# Patient Record
Sex: FEMALE | Race: WHITE | NOT HISPANIC OR LATINO
[De-identification: names, ages, dates, MRNs, and addresses within clinical notes are randomized per-mention and may not be internally consistent; named-entity substitution may affect disease eponyms.]

---

## 2021-10-24 ENCOUNTER — NON-APPOINTMENT (OUTPATIENT)
Age: 36
End: 2021-10-24

## 2021-10-25 ENCOUNTER — TRANSCRIPTION ENCOUNTER (OUTPATIENT)
Age: 36
End: 2021-10-25

## 2021-10-25 ENCOUNTER — APPOINTMENT (OUTPATIENT)
Dept: INTERNAL MEDICINE | Facility: CLINIC | Age: 36
End: 2021-10-25
Payer: COMMERCIAL

## 2021-10-25 VITALS
OXYGEN SATURATION: 100 % | HEIGHT: 62 IN | BODY MASS INDEX: 24.11 KG/M2 | WEIGHT: 131 LBS | HEART RATE: 71 BPM | DIASTOLIC BLOOD PRESSURE: 77 MMHG | SYSTOLIC BLOOD PRESSURE: 121 MMHG | TEMPERATURE: 97.2 F

## 2021-10-25 DIAGNOSIS — Z78.9 OTHER SPECIFIED HEALTH STATUS: ICD-10-CM

## 2021-10-25 DIAGNOSIS — Z82.49 FAMILY HISTORY OF ISCHEMIC HEART DISEASE AND OTHER DISEASES OF THE CIRCULATORY SYSTEM: ICD-10-CM

## 2021-10-25 DIAGNOSIS — Z00.00 ENCOUNTER FOR GENERAL ADULT MEDICAL EXAMINATION W/OUT ABNORMAL FINDINGS: ICD-10-CM

## 2021-10-25 DIAGNOSIS — Z83.511 FAMILY HISTORY OF GLAUCOMA: ICD-10-CM

## 2021-10-25 DIAGNOSIS — L30.9 DERMATITIS, UNSPECIFIED: ICD-10-CM

## 2021-10-25 PROCEDURE — 99385 PREV VISIT NEW AGE 18-39: CPT | Mod: 25

## 2021-10-25 PROCEDURE — 90686 IIV4 VACC NO PRSV 0.5 ML IM: CPT

## 2021-10-25 PROCEDURE — G0008: CPT

## 2021-10-25 PROCEDURE — 36415 COLL VENOUS BLD VENIPUNCTURE: CPT

## 2021-10-25 RX ORDER — CETIRIZINE HYDROCHLORIDE 10 MG/1
CAPSULE, LIQUID FILLED ORAL
Refills: 0 | Status: ACTIVE | COMMUNITY

## 2021-10-25 NOTE — PHYSICAL EXAM
[No Acute Distress] : no acute distress [Normal Sclera/Conjunctiva] : normal sclera/conjunctiva [No Lymphadenopathy] : no lymphadenopathy [Clear to Auscultation] : lungs were clear to auscultation bilaterally [Normal Rate] : normal rate  [Regular Rhythm] : with a regular rhythm [Pedal Pulses Present] : the pedal pulses are present [No Edema] : there was no peripheral edema [Declined Breast Exam] : declined breast exam  [Soft] : abdomen soft [Non Tender] : non-tender [Normal Supraclavicular Nodes] : no supraclavicular lymphadenopathy [Normal Axillary Nodes] : no axillary lymphadenopathy [Normal Posterior Cervical Nodes] : no posterior cervical lymphadenopathy [Normal Anterior Cervical Nodes] : no anterior cervical lymphadenopathy [No CVA Tenderness] : no CVA  tenderness [No Rash] : no rash [Normal] : affect was normal and insight and judgment were intact

## 2021-10-25 NOTE — HISTORY OF PRESENT ILLNESS
[FreeTextEntry1] : 1. Pt presents to establish Primary Care and is requesting an Annual Physical.\par 2. COVID vaccine x 2

## 2021-10-25 NOTE — HEALTH RISK ASSESSMENT
[Yes] : Yes [Monthly or less (1 pt)] : Monthly or less (1 point) [1 or 2 (0 pts)] : 1 or 2 (0 points) [No] : In the past 12 months have you used drugs other than those required for medical reasons? No [0] : 2) Feeling down, depressed, or hopeless: Not at all (0) [PHQ-2 Negative - No further assessment needed] : PHQ-2 Negative - No further assessment needed [Patient reported PAP Smear was normal] : Patient reported PAP Smear was normal [HIV Test offered] : HIV Test offered [Hepatitis C test offered] : Hepatitis C test offered [With Family] : lives with family [Employed] : employed [] :  [Sexually Active] : sexually active [Smoke Detector] : smoke detector [Carbon Monoxide Detector] : carbon monoxide detector [Seat Belt] :  uses seat belt [Sunscreen] : uses sunscreen [] : No [[id number]] : 0 [Reports changes in hearing] : Reports no changes in hearing [Reports changes in vision] : Reports no changes in vision [Reports changes in dental health] : Reports no changes in dental health [Guns at Home] : no guns at home [PapSmearDate] : 5/2021 [de-identified] :  [place]  [de-identified] : BCP

## 2021-10-26 DIAGNOSIS — Z78.9 OTHER SPECIFIED HEALTH STATUS: ICD-10-CM

## 2021-10-26 LAB
ALBUMIN SERPL ELPH-MCNC: 4.7 G/DL
ALP BLD-CCNC: 57 U/L
ALT SERPL-CCNC: 13 U/L
ANION GAP SERPL CALC-SCNC: 13 MMOL/L
APPEARANCE: CLEAR
AST SERPL-CCNC: 15 U/L
BACTERIA: ABNORMAL
BASOPHILS # BLD AUTO: 0.05 K/UL
BASOPHILS NFR BLD AUTO: 0.6 %
BILIRUB SERPL-MCNC: 0.4 MG/DL
BILIRUBIN URINE: NEGATIVE
BLOOD URINE: NEGATIVE
BUN SERPL-MCNC: 10 MG/DL
C TRACH RRNA SPEC QL NAA+PROBE: NOT DETECTED
CALCIUM SERPL-MCNC: 9.4 MG/DL
CHLORIDE SERPL-SCNC: 102 MMOL/L
CHOLEST SERPL-MCNC: 239 MG/DL
CO2 SERPL-SCNC: 22 MMOL/L
COLOR: COLORLESS
CREAT SERPL-MCNC: 0.58 MG/DL
EOSINOPHIL # BLD AUTO: 0.21 K/UL
EOSINOPHIL NFR BLD AUTO: 2.5 %
ESTIMATED AVERAGE GLUCOSE: 97 MG/DL
FOLATE SERPL-MCNC: 16.7 NG/ML
GLUCOSE QUALITATIVE U: NEGATIVE
GLUCOSE SERPL-MCNC: 78 MG/DL
HBA1C MFR BLD HPLC: 5 %
HBV SURFACE AB SER QL: NONREACTIVE
HBV SURFACE AG SER QL: NONREACTIVE
HCT VFR BLD CALC: 43.1 %
HCV AB SER QL: NONREACTIVE
HCV S/CO RATIO: 0.11 S/CO
HDLC SERPL-MCNC: 65 MG/DL
HGB BLD-MCNC: 13.5 G/DL
HYALINE CASTS: 3 /LPF
IMM GRANULOCYTES NFR BLD AUTO: 0.5 %
KETONES URINE: NEGATIVE
LDLC SERPL CALC-MCNC: 146 MG/DL
LEUKOCYTE ESTERASE URINE: ABNORMAL
LYMPHOCYTES # BLD AUTO: 2.13 K/UL
LYMPHOCYTES NFR BLD AUTO: 25.8 %
MAN DIFF?: NORMAL
MCHC RBC-ENTMCNC: 29.7 PG
MCHC RBC-ENTMCNC: 31.3 GM/DL
MCV RBC AUTO: 94.9 FL
MICROSCOPIC-UA: NORMAL
MONOCYTES # BLD AUTO: 0.51 K/UL
MONOCYTES NFR BLD AUTO: 6.2 %
N GONORRHOEA RRNA SPEC QL NAA+PROBE: NOT DETECTED
NEUTROPHILS # BLD AUTO: 5.31 K/UL
NEUTROPHILS NFR BLD AUTO: 64.4 %
NITRITE URINE: NEGATIVE
NONHDLC SERPL-MCNC: 174 MG/DL
PH URINE: 7
PLATELET # BLD AUTO: 290 K/UL
POTASSIUM SERPL-SCNC: 4.5 MMOL/L
PROT SERPL-MCNC: 7.1 G/DL
PROTEIN URINE: NEGATIVE
RBC # BLD: 4.54 M/UL
RBC # FLD: 13.1 %
RED BLOOD CELLS URINE: 2 /HPF
SODIUM SERPL-SCNC: 137 MMOL/L
SOURCE AMPLIFICATION: NORMAL
SPECIFIC GRAVITY URINE: 1.01
SQUAMOUS EPITHELIAL CELLS: 4 /HPF
T PALLIDUM AB SER QL IA: NEGATIVE
TRIGL SERPL-MCNC: 140 MG/DL
TSH SERPL-ACNC: 3.61 UIU/ML
UROBILINOGEN URINE: NORMAL
VIT B12 SERPL-MCNC: 292 PG/ML
WBC # FLD AUTO: 8.25 K/UL
WHITE BLOOD CELLS URINE: 12 /HPF

## 2021-10-27 ENCOUNTER — TRANSCRIPTION ENCOUNTER (OUTPATIENT)
Age: 36
End: 2021-10-27

## 2021-10-27 LAB — HIV1+2 AB SPEC QL IA.RAPID: NONREACTIVE

## 2022-04-25 ENCOUNTER — TRANSCRIPTION ENCOUNTER (OUTPATIENT)
Age: 37
End: 2022-04-25

## 2022-04-27 ENCOUNTER — APPOINTMENT (OUTPATIENT)
Dept: INTERNAL MEDICINE | Facility: CLINIC | Age: 37
End: 2022-04-27
Payer: COMMERCIAL

## 2022-04-27 VITALS
HEART RATE: 89 BPM | WEIGHT: 128 LBS | OXYGEN SATURATION: 98 % | DIASTOLIC BLOOD PRESSURE: 75 MMHG | HEIGHT: 62 IN | BODY MASS INDEX: 23.55 KG/M2 | TEMPERATURE: 97.9 F | SYSTOLIC BLOOD PRESSURE: 125 MMHG

## 2022-04-27 DIAGNOSIS — E78.00 PURE HYPERCHOLESTEROLEMIA, UNSPECIFIED: ICD-10-CM

## 2022-04-27 DIAGNOSIS — Z23 ENCOUNTER FOR IMMUNIZATION: ICD-10-CM

## 2022-04-27 PROCEDURE — 99212 OFFICE O/P EST SF 10 MIN: CPT | Mod: 25

## 2022-04-27 PROCEDURE — 90746 HEPB VACCINE 3 DOSE ADULT IM: CPT

## 2022-04-27 PROCEDURE — 90715 TDAP VACCINE 7 YRS/> IM: CPT

## 2022-04-27 PROCEDURE — 90471 IMMUNIZATION ADMIN: CPT

## 2022-04-27 PROCEDURE — 90472 IMMUNIZATION ADMIN EACH ADD: CPT

## 2022-04-27 RX ORDER — NORETHINDRONE AND ETHINYL ESTRADIOL 1 MG-35MCG
1-35 KIT ORAL
Qty: 84 | Refills: 0 | Status: DISCONTINUED | COMMUNITY
Start: 2021-05-10 | End: 2022-04-27

## 2022-04-28 ENCOUNTER — TRANSCRIPTION ENCOUNTER (OUTPATIENT)
Age: 37
End: 2022-04-28

## 2022-04-28 LAB
CHOLEST SERPL-MCNC: 197 MG/DL
HDLC SERPL-MCNC: 60 MG/DL
LDLC SERPL CALC-MCNC: 119 MG/DL
NONHDLC SERPL-MCNC: 138 MG/DL
TRIGL SERPL-MCNC: 93 MG/DL

## 2022-07-11 ENCOUNTER — APPOINTMENT (OUTPATIENT)
Dept: OBGYN | Facility: CLINIC | Age: 37
End: 2022-07-11

## 2022-08-25 ENCOUNTER — APPOINTMENT (OUTPATIENT)
Dept: ANTEPARTUM | Facility: CLINIC | Age: 37
End: 2022-08-25

## 2022-08-25 ENCOUNTER — ASOB RESULT (OUTPATIENT)
Age: 37
End: 2022-08-25

## 2022-08-25 ENCOUNTER — LABORATORY RESULT (OUTPATIENT)
Age: 37
End: 2022-08-25

## 2022-08-25 PROCEDURE — 76813 OB US NUCHAL MEAS 1 GEST: CPT

## 2022-08-25 PROCEDURE — 36415 COLL VENOUS BLD VENIPUNCTURE: CPT

## 2022-08-25 PROCEDURE — 76801 OB US < 14 WKS SINGLE FETUS: CPT

## 2022-10-21 ENCOUNTER — ASOB RESULT (OUTPATIENT)
Age: 37
End: 2022-10-21

## 2022-10-21 ENCOUNTER — APPOINTMENT (OUTPATIENT)
Dept: ANTEPARTUM | Facility: CLINIC | Age: 37
End: 2022-10-21

## 2022-10-21 PROCEDURE — 76811 OB US DETAILED SNGL FETUS: CPT

## 2023-03-07 ENCOUNTER — TRANSCRIPTION ENCOUNTER (OUTPATIENT)
Age: 38
End: 2023-03-07

## 2023-03-07 ENCOUNTER — INPATIENT (INPATIENT)
Facility: HOSPITAL | Age: 38
LOS: 3 days | Discharge: HOME | End: 2023-03-11
Attending: STUDENT IN AN ORGANIZED HEALTH CARE EDUCATION/TRAINING PROGRAM | Admitting: STUDENT IN AN ORGANIZED HEALTH CARE EDUCATION/TRAINING PROGRAM
Payer: COMMERCIAL

## 2023-03-07 VITALS — DIASTOLIC BLOOD PRESSURE: 84 MMHG | TEMPERATURE: 98 F | HEART RATE: 93 BPM | SYSTOLIC BLOOD PRESSURE: 129 MMHG

## 2023-03-07 DIAGNOSIS — O26.899 OTHER SPECIFIED PREGNANCY RELATED CONDITIONS, UNSPECIFIED TRIMESTER: ICD-10-CM

## 2023-03-08 ENCOUNTER — TRANSCRIPTION ENCOUNTER (OUTPATIENT)
Age: 38
End: 2023-03-08

## 2023-03-08 LAB
ALBUMIN SERPL ELPH-MCNC: 3.8 G/DL — SIGNIFICANT CHANGE UP (ref 3.3–5)
ALP SERPL-CCNC: 164 U/L — HIGH (ref 40–120)
ALT FLD-CCNC: 15 U/L — SIGNIFICANT CHANGE UP (ref 4–33)
ANION GAP SERPL CALC-SCNC: 17 MMOL/L — HIGH (ref 7–14)
APPEARANCE UR: ABNORMAL
APTT BLD: 26.2 SEC — LOW (ref 27–36.3)
AST SERPL-CCNC: 18 U/L — SIGNIFICANT CHANGE UP (ref 4–32)
BASOPHILS # BLD AUTO: 0 K/UL — SIGNIFICANT CHANGE UP (ref 0–0.2)
BASOPHILS NFR BLD AUTO: 0 % — SIGNIFICANT CHANGE UP (ref 0–2)
BILIRUB SERPL-MCNC: 0.5 MG/DL — SIGNIFICANT CHANGE UP (ref 0.2–1.2)
BILIRUB UR-MCNC: NEGATIVE — SIGNIFICANT CHANGE UP
BLD GP AB SCN SERPL QL: NEGATIVE — SIGNIFICANT CHANGE UP
BUN SERPL-MCNC: 7 MG/DL — SIGNIFICANT CHANGE UP (ref 7–23)
CALCIUM SERPL-MCNC: 9.5 MG/DL — SIGNIFICANT CHANGE UP (ref 8.4–10.5)
CHLORIDE SERPL-SCNC: 98 MMOL/L — SIGNIFICANT CHANGE UP (ref 98–107)
CO2 SERPL-SCNC: 20 MMOL/L — LOW (ref 22–31)
COLOR SPEC: ABNORMAL
COVID-19 SPIKE DOMAIN AB INTERP: POSITIVE
COVID-19 SPIKE DOMAIN ANTIBODY RESULT: >250 U/ML — HIGH
CREAT ?TM UR-MCNC: 32 MG/DL — SIGNIFICANT CHANGE UP
CREAT SERPL-MCNC: 0.49 MG/DL — LOW (ref 0.5–1.3)
DIFF PNL FLD: ABNORMAL
EGFR: 126 ML/MIN/1.73M2 — SIGNIFICANT CHANGE UP
EOSINOPHIL # BLD AUTO: 0.11 K/UL — SIGNIFICANT CHANGE UP (ref 0–0.5)
EOSINOPHIL NFR BLD AUTO: 0.8 % — SIGNIFICANT CHANGE UP (ref 0–6)
GLUCOSE BLDC GLUCOMTR-MCNC: 74 MG/DL — SIGNIFICANT CHANGE UP (ref 70–99)
GLUCOSE SERPL-MCNC: 50 MG/DL — CRITICAL LOW (ref 70–99)
GLUCOSE UR QL: NEGATIVE — SIGNIFICANT CHANGE UP
HCT VFR BLD CALC: 37.6 % — SIGNIFICANT CHANGE UP (ref 34.5–45)
HGB BLD-MCNC: 12.5 G/DL — SIGNIFICANT CHANGE UP (ref 11.5–15.5)
IANC: 9.4 K/UL — HIGH (ref 1.8–7.4)
INR BLD: 0.95 RATIO — SIGNIFICANT CHANGE UP (ref 0.88–1.16)
KETONES UR-MCNC: ABNORMAL
LDH SERPL L TO P-CCNC: 216 U/L — SIGNIFICANT CHANGE UP (ref 135–225)
LEUKOCYTE ESTERASE UR-ACNC: ABNORMAL
LYMPHOCYTES # BLD AUTO: 14.8 % — SIGNIFICANT CHANGE UP (ref 13–44)
LYMPHOCYTES # BLD AUTO: 2.05 K/UL — SIGNIFICANT CHANGE UP (ref 1–3.3)
MCHC RBC-ENTMCNC: 30.6 PG — SIGNIFICANT CHANGE UP (ref 27–34)
MCHC RBC-ENTMCNC: 33.2 GM/DL — SIGNIFICANT CHANGE UP (ref 32–36)
MCV RBC AUTO: 91.9 FL — SIGNIFICANT CHANGE UP (ref 80–100)
MONOCYTES # BLD AUTO: 0.84 K/UL — SIGNIFICANT CHANGE UP (ref 0–0.9)
MONOCYTES NFR BLD AUTO: 6.1 % — SIGNIFICANT CHANGE UP (ref 2–14)
NEUTROPHILS # BLD AUTO: 10.24 K/UL — HIGH (ref 1.8–7.4)
NEUTROPHILS NFR BLD AUTO: 73.9 % — SIGNIFICANT CHANGE UP (ref 43–77)
NITRITE UR-MCNC: NEGATIVE — SIGNIFICANT CHANGE UP
PH UR: 6.5 — SIGNIFICANT CHANGE UP (ref 5–8)
PLATELET # BLD AUTO: 214 K/UL — SIGNIFICANT CHANGE UP (ref 150–400)
POTASSIUM SERPL-MCNC: 3.4 MMOL/L — LOW (ref 3.5–5.3)
POTASSIUM SERPL-SCNC: 3.4 MMOL/L — LOW (ref 3.5–5.3)
PROT ?TM UR-MCNC: 7 MG/DL — SIGNIFICANT CHANGE UP
PROT ?TM UR-MCNC: 7 MG/DL — SIGNIFICANT CHANGE UP
PROT SERPL-MCNC: 6.5 G/DL — SIGNIFICANT CHANGE UP (ref 6–8.3)
PROT UR-MCNC: ABNORMAL
PROT/CREAT UR-RTO: 0.2 RATIO — SIGNIFICANT CHANGE UP (ref 0–0.2)
PROTHROM AB SERPL-ACNC: 11 SEC — SIGNIFICANT CHANGE UP (ref 10.5–13.4)
RBC # BLD: 4.09 M/UL — SIGNIFICANT CHANGE UP (ref 3.8–5.2)
RBC # FLD: 14.1 % — SIGNIFICANT CHANGE UP (ref 10.3–14.5)
RH IG SCN BLD-IMP: POSITIVE — SIGNIFICANT CHANGE UP
RH IG SCN BLD-IMP: POSITIVE — SIGNIFICANT CHANGE UP
SARS-COV-2 IGG+IGM SERPL QL IA: >250 U/ML — HIGH
SARS-COV-2 IGG+IGM SERPL QL IA: POSITIVE
SARS-COV-2 RNA SPEC QL NAA+PROBE: SIGNIFICANT CHANGE UP
SODIUM SERPL-SCNC: 135 MMOL/L — SIGNIFICANT CHANGE UP (ref 135–145)
SP GR SPEC: 1.01 — LOW (ref 1.01–1.05)
T PALLIDUM AB TITR SER: NEGATIVE — SIGNIFICANT CHANGE UP
URATE SERPL-MCNC: 4.3 MG/DL — SIGNIFICANT CHANGE UP (ref 2.5–7)
UROBILINOGEN FLD QL: SIGNIFICANT CHANGE UP
WBC # BLD: 13.85 K/UL — HIGH (ref 3.8–10.5)
WBC # FLD AUTO: 13.85 K/UL — HIGH (ref 3.8–10.5)

## 2023-03-08 PROCEDURE — 88307 TISSUE EXAM BY PATHOLOGIST: CPT | Mod: 26

## 2023-03-08 DEVICE — SURGICEL POWDER 3 GRAMS: Type: IMPLANTABLE DEVICE | Status: FUNCTIONAL

## 2023-03-08 RX ORDER — SODIUM CHLORIDE 9 MG/ML
1000 INJECTION, SOLUTION INTRAVENOUS
Refills: 0 | Status: DISCONTINUED | OUTPATIENT
Start: 2023-03-08 | End: 2023-03-08

## 2023-03-08 RX ORDER — SODIUM CHLORIDE 9 MG/ML
500 INJECTION, SOLUTION INTRAVENOUS ONCE
Refills: 0 | Status: DISCONTINUED | OUTPATIENT
Start: 2023-03-08 | End: 2023-03-11

## 2023-03-08 RX ORDER — SODIUM CHLORIDE 9 MG/ML
1000 INJECTION, SOLUTION INTRAVENOUS
Refills: 0 | Status: DISCONTINUED | OUTPATIENT
Start: 2023-03-08 | End: 2023-03-09

## 2023-03-08 RX ORDER — NALOXONE HYDROCHLORIDE 4 MG/.1ML
0.1 SPRAY NASAL
Refills: 0 | Status: DISCONTINUED | OUTPATIENT
Start: 2023-03-08 | End: 2023-03-09

## 2023-03-08 RX ORDER — BUTORPHANOL TARTRATE 2 MG/ML
2 INJECTION, SOLUTION INTRAMUSCULAR; INTRAVENOUS ONCE
Refills: 0 | Status: DISCONTINUED | OUTPATIENT
Start: 2023-03-08 | End: 2023-03-08

## 2023-03-08 RX ORDER — LANOLIN
1 OINTMENT (GRAM) TOPICAL EVERY 6 HOURS
Refills: 0 | Status: DISCONTINUED | OUTPATIENT
Start: 2023-03-08 | End: 2023-03-11

## 2023-03-08 RX ORDER — CHLORHEXIDINE GLUCONATE 213 G/1000ML
1 SOLUTION TOPICAL ONCE
Refills: 0 | Status: COMPLETED | OUTPATIENT
Start: 2023-03-08 | End: 2023-03-08

## 2023-03-08 RX ORDER — ONDANSETRON 8 MG/1
4 TABLET, FILM COATED ORAL EVERY 6 HOURS
Refills: 0 | Status: DISCONTINUED | OUTPATIENT
Start: 2023-03-08 | End: 2023-03-09

## 2023-03-08 RX ORDER — IBUPROFEN 200 MG
1 TABLET ORAL
Qty: 0 | Refills: 0 | DISCHARGE
Start: 2023-03-08

## 2023-03-08 RX ORDER — KETOROLAC TROMETHAMINE 30 MG/ML
30 SYRINGE (ML) INJECTION EVERY 6 HOURS
Refills: 0 | Status: DISCONTINUED | OUTPATIENT
Start: 2023-03-08 | End: 2023-03-09

## 2023-03-08 RX ORDER — LANOLIN
1 OINTMENT (GRAM) TOPICAL
Qty: 0 | Refills: 0 | DISCHARGE
Start: 2023-03-08

## 2023-03-08 RX ORDER — OXYCODONE HYDROCHLORIDE 5 MG/1
5 TABLET ORAL
Refills: 0 | Status: DISCONTINUED | OUTPATIENT
Start: 2023-03-08 | End: 2023-03-11

## 2023-03-08 RX ORDER — DEXAMETHASONE 0.5 MG/5ML
4 ELIXIR ORAL EVERY 6 HOURS
Refills: 0 | Status: DISCONTINUED | OUTPATIENT
Start: 2023-03-08 | End: 2023-03-09

## 2023-03-08 RX ORDER — IBUPROFEN 200 MG
600 TABLET ORAL EVERY 6 HOURS
Refills: 0 | Status: COMPLETED | OUTPATIENT
Start: 2023-03-08 | End: 2024-02-04

## 2023-03-08 RX ORDER — OXYTOCIN 10 UNIT/ML
333.33 VIAL (ML) INJECTION
Qty: 20 | Refills: 0 | Status: DISCONTINUED | OUTPATIENT
Start: 2023-03-08 | End: 2023-03-11

## 2023-03-08 RX ORDER — OXYCODONE HYDROCHLORIDE 5 MG/1
5 TABLET ORAL ONCE
Refills: 0 | Status: DISCONTINUED | OUTPATIENT
Start: 2023-03-08 | End: 2023-03-11

## 2023-03-08 RX ORDER — SIMETHICONE 80 MG/1
80 TABLET, CHEWABLE ORAL EVERY 4 HOURS
Refills: 0 | Status: DISCONTINUED | OUTPATIENT
Start: 2023-03-08 | End: 2023-03-11

## 2023-03-08 RX ORDER — SIMETHICONE 80 MG/1
1 TABLET, CHEWABLE ORAL
Qty: 0 | Refills: 0 | DISCHARGE
Start: 2023-03-08

## 2023-03-08 RX ORDER — OXYCODONE HYDROCHLORIDE 5 MG/1
5 TABLET ORAL
Refills: 0 | Status: COMPLETED | OUTPATIENT
Start: 2023-03-08 | End: 2023-03-15

## 2023-03-08 RX ORDER — MAGNESIUM HYDROXIDE 400 MG/1
30 TABLET, CHEWABLE ORAL
Refills: 0 | Status: DISCONTINUED | OUTPATIENT
Start: 2023-03-08 | End: 2023-03-11

## 2023-03-08 RX ORDER — DIPHENHYDRAMINE HCL 50 MG
25 CAPSULE ORAL EVERY 6 HOURS
Refills: 0 | Status: DISCONTINUED | OUTPATIENT
Start: 2023-03-08 | End: 2023-03-11

## 2023-03-08 RX ORDER — SODIUM CHLORIDE 9 MG/ML
500 INJECTION, SOLUTION INTRAVENOUS ONCE
Refills: 0 | Status: DISCONTINUED | OUTPATIENT
Start: 2023-03-08 | End: 2023-03-09

## 2023-03-08 RX ORDER — CITRIC ACID/SODIUM CITRATE 300-500 MG
15 SOLUTION, ORAL ORAL EVERY 6 HOURS
Refills: 0 | Status: DISCONTINUED | OUTPATIENT
Start: 2023-03-08 | End: 2023-03-08

## 2023-03-08 RX ORDER — HEPARIN SODIUM 5000 [USP'U]/ML
5000 INJECTION INTRAVENOUS; SUBCUTANEOUS EVERY 12 HOURS
Refills: 0 | Status: DISCONTINUED | OUTPATIENT
Start: 2023-03-08 | End: 2023-03-11

## 2023-03-08 RX ORDER — TETANUS TOXOID, REDUCED DIPHTHERIA TOXOID AND ACELLULAR PERTUSSIS VACCINE, ADSORBED 5; 2.5; 8; 8; 2.5 [IU]/.5ML; [IU]/.5ML; UG/.5ML; UG/.5ML; UG/.5ML
0.5 SUSPENSION INTRAMUSCULAR ONCE
Refills: 0 | Status: DISCONTINUED | OUTPATIENT
Start: 2023-03-08 | End: 2023-03-11

## 2023-03-08 RX ORDER — MORPHINE SULFATE 50 MG/1
2 CAPSULE, EXTENDED RELEASE ORAL ONCE
Refills: 0 | Status: DISCONTINUED | OUTPATIENT
Start: 2023-03-08 | End: 2023-03-09

## 2023-03-08 RX ORDER — ACETAMINOPHEN 500 MG
975 TABLET ORAL
Refills: 0 | Status: DISCONTINUED | OUTPATIENT
Start: 2023-03-08 | End: 2023-03-11

## 2023-03-08 RX ORDER — OXYTOCIN 10 UNIT/ML
333.33 VIAL (ML) INJECTION
Qty: 20 | Refills: 0 | Status: DISCONTINUED | OUTPATIENT
Start: 2023-03-08 | End: 2023-03-09

## 2023-03-08 RX ADMIN — Medication 1000 MILLIUNIT(S)/MIN: at 18:53

## 2023-03-08 RX ADMIN — HEPARIN SODIUM 5000 UNIT(S): 5000 INJECTION INTRAVENOUS; SUBCUTANEOUS at 23:16

## 2023-03-08 RX ADMIN — Medication 975 MILLIGRAM(S): at 20:55

## 2023-03-08 RX ADMIN — Medication 30 MILLIGRAM(S): at 17:15

## 2023-03-08 RX ADMIN — Medication 0.25 MILLIGRAM(S): at 13:02

## 2023-03-08 RX ADMIN — Medication 30 MILLIGRAM(S): at 23:16

## 2023-03-08 RX ADMIN — SODIUM CHLORIDE 125 MILLILITER(S): 9 INJECTION, SOLUTION INTRAVENOUS at 01:52

## 2023-03-08 RX ADMIN — Medication 30 MILLIGRAM(S): at 23:45

## 2023-03-08 RX ADMIN — CHLORHEXIDINE GLUCONATE 1 APPLICATION(S): 213 SOLUTION TOPICAL at 00:00

## 2023-03-08 RX ADMIN — Medication 975 MILLIGRAM(S): at 21:42

## 2023-03-08 RX ADMIN — SODIUM CHLORIDE 75 MILLILITER(S): 9 INJECTION, SOLUTION INTRAVENOUS at 18:53

## 2023-03-08 RX ADMIN — BUTORPHANOL TARTRATE 2 MILLIGRAM(S): 2 INJECTION, SOLUTION INTRAMUSCULAR; INTRAVENOUS at 11:30

## 2023-03-08 RX ADMIN — BUTORPHANOL TARTRATE 2 MILLIGRAM(S): 2 INJECTION, SOLUTION INTRAMUSCULAR; INTRAVENOUS at 11:00

## 2023-03-08 NOTE — OB PROVIDER LABOR PROGRESS NOTE - ASSESSMENT
CNM OB Progress Note    Patient seen and evaluated at bedside.  Reports painful ctx, requesting anesthesia epidural.      T(C): 36.6 (03-08-23 @ 10:30), Max: 37.1 (03-08-23 @ 04:00)  HR: 96 (03-08-23 @ 13:11) (74 - 99)  BP: 119/79 (03-08-23 @ 10:30) (117/74 - 142/76)  RR: 17 (03-08-23 @ 10:30) (17 - 19)  SpO2: 100% (03-08-23 @ 13:11) (91% - 100%)    SVE: 3/60/-3    repositioned for VE, CB felt in vagina, both balloons deflated, CB removed  VE 3/60/-3  SROM clear fluids confirmed, moderate amount of fluids on pipo under patient and leaking from vagina    patient repositioned to left and right lateral after late deceleration was noted, and turned into prolonged 3 minute decel  Return to baseline noted, MD Almanzar made aware  Followed by late deceleration after next contraction, Terbutaline given at 1303pm    MD Almanzar at the bedside, plan of care discussed with patient, charge RN made aware and present at bedside.  Patient to be moved to L&D, to LDR 7 for continued close monitoring once tracing is stable    Patient to get anesthesia epidural, Anesthesia MD to be called with request once patient is in L&D room       -Continue to monitor with EFM & TOCO  -Recheck patient in 2-4 hours or PRN    Discussed with MD Yohan López

## 2023-03-08 NOTE — OB PROVIDER DELIVERY SUMMARY - NSSELHIDDEN_OBGYN_ALL_OB_FT
[NS_DeliveryAttending1_OBGYN_ALL_OB_FT:[id number]=],[NS_DeliveryAssist1_OBGYN_ALL_OB_FT:[id number]=],[NS_DeliveryRN_OBGYN_ALL_OB_FT:[id number]==]

## 2023-03-08 NOTE — DISCHARGE NOTE OB - MEDICATION SUMMARY - MEDICATIONS TO TAKE
I will START or STAY ON the medications listed below when I get home from the hospital:    Tylenol 325 mg oral tablet  -- 3 tab(s) by mouth every 6 hours, As Needed  -- Indication: For pain    ibuprofen 600 mg oral tablet  -- 1 tab(s) by mouth every 6 hours, As Needed  -- Indication: For pain    lanolin topical ointment  -- 1 application on skin every 6 hours, As needed, Sore Nipples  -- Indication: For breast nipple pain    simethicone 80 mg oral tablet, chewable  -- 1 tab(s) by mouth every 4 hours, As needed, Gas  -- Indication: For gas pain

## 2023-03-08 NOTE — OB RN DELIVERY SUMMARY - NSSELHIDDEN_OBGYN_ALL_OB_FT
[NS_DeliveryAttending1_OBGYN_ALL_OB_FT:[id number]=],[NS_DeliveryAssist1_OBGYN_ALL_OB_FT:[id number]=],[NS_DeliveryRN_OBGYN_ALL_OB_FT:[id number]==]

## 2023-03-08 NOTE — OB PROVIDER H&P - ASSESSMENT
Assessment  36yo  at 40w0d presents for IOL secondary to IUGR.     Plan  - Admit to LND. Routine Labs. IVF.  - IOL w/ PO and CB when able  - Fetus: VTX. Continuous EFM.  - Prenatal issues: IUGR  - GBS neg  - Pain: epidural PRN      D/w attending Dr. Cronin,  Jesusita Ann, PGY-1

## 2023-03-08 NOTE — DISCHARGE NOTE OB - DRINK 8 TO 10 GLASSES OF FLUID EACH DAY
Repatha PUSTRONEX follow up and refill attempted.  No answer.  [facility] for call back.  $8.50 in 004   Statement Selected

## 2023-03-08 NOTE — CHART NOTE - NSCHARTNOTEFT_GEN_A_CORE
Patient was counseled at bedside for Category 2 tracing   ve: /-2 IUPC/ FSE  in place   patient was counseled got  section if Category 2 tracing persist despite resuscitative measure   Informed consent signed   PTA meeting held with Dr France and Dr Sorensen  L/d team aware   Continue resuscitation

## 2023-03-08 NOTE — CHART NOTE - NSCHARTNOTEFT_GEN_A_CORE
R4 Chart Note - PTA     PTA called 2/2 category II tracing with loss of variability after decelerations.     Dr. Almanzar (OB Attending), Dr. France (OB Safety), RN and Charge RN present.     EFM: 150 minimal-moderate variability, +decelerations, -accelerations     Patient s/p ROM and 1st dose of 40mcg PO Cytotec at approximately 1530, reexamined found to be 5/80/-3. Has already received terbutaline 1. Currently undergoing fetal resuscitation with IVF, positioning.     Plan: Dr. Almanzar to recommend delivery via pLTCS 2/2 concern for compromised fetal status given decelerations with loss of fetal variability.     Patricia Lloyd MD, PGY4

## 2023-03-08 NOTE — DISCHARGE NOTE OB - CARE PROVIDER_API CALL
Joby Almanzar)  Obstetrics and Gynecology  [address] [place] [address]  Phone: [phone]  Fax: [phone]  Follow Up Time: 1 week

## 2023-03-08 NOTE — DISCHARGE NOTE OB - PATIENT PORTAL LINK FT
You can access the FollowMyHealth Patient Portal offered by [facility] by registering at the following website: [email or web address]. By joining [employer]’s FollowMyHealth portal, you will also be able to view your health information using other applications (apps) compatible with our system.

## 2023-03-08 NOTE — OB PROVIDER LABOR PROGRESS NOTE - ASSESSMENT
patient tolerated exam well    - IUGR induction on PO cytotec  - FHT improvement on hands and knees  - c/w maternal repositioning for resuscitation  - switch to Pitocin if possible  - patient counseled on potential  if fht does not recovery    Amyeo Afroz Jereen, PGY-2  Seen and discussed with Dr Almanzar patient tolerated exam well    - IUGR induction on PO cytotec  - FHT improvement on hands and knees  - terbutaline by bedside if FHT worsens  - c/w maternal repositioning for resuscitation  - switch to Pitocin if possible  - patient counseled on potential  if fht does not recovery    Amyeo Afroz Jereen, PGY-2  Seen and discussed with Dr Almanzar patient tolerated exam well    - IUGR induction on PO cytotec  - FHT improvement on hands and knees  - terbutaline by bedside if FHT worsens  - c/w maternal repositioning for resuscitation  - switch to Pitocin if possible  - patient counseled on potential  if fht does not recover    Amyeo Afroz Jereen, PGY-2  Seen and discussed with Dr Almanzar

## 2023-03-08 NOTE — OB RN DELIVERY SUMMARY - NS_SEPSISRSKCALC_OBGYN_ALL_OB_FT
EOS calculated successfully. EOS Risk Factor: 0.5/1000 live births ([facility] national incidence); GA=40w;Temp=98.78; ROM=4.267; GBS='Negative'; Antibiotics='No antibiotics or any antibiotics < 2 hrs prior to birth'

## 2023-03-08 NOTE — OB PROVIDER LABOR PROGRESS NOTE - NS_SUBJECTIVE/OBJECTIVE_OBGYN_ALL_OB_FT
Patient seen at the bedside for request of anesthesia epidural. Also, RN states patient thinks she broke her water and is having vaginal leakage of clear fluids

## 2023-03-08 NOTE — OB PROVIDER H&P - NSHPPHYSICALEXAM_GEN_ALL_CORE
Objective  VS  T(C): 36.8 (03-07-23 @ 23:50)  HR: 93 (03-07-23 @ 23:50)  BP: 129/84 (03-07-23 @ 23:50)  RR: 19 (03-07-23 @ 23:50)  SpO2: --    PE:   CV: clinically well perfused  Pulm: breathing comfortably on RA  Abd: gravid, nontender  Extr: moving all extremities with ease    VE: 0/50/-3    FHT: indeterminent baseline at this time; moderate variability  [place]: q3min  Sono: vertex

## 2023-03-08 NOTE — OB RN INTRAOPERATIVE NOTE - NSSELHIDDEN_OBGYN_ALL_OB_FT
[NS_DeliveryAttending1_OBGYN_ALL_OB_FT:[email or web address]=],[NS_DeliveryAssist1_OBGYN_ALL_OB_FT:[id number]] [NS_DeliveryAttending1_OBGYN_ALL_OB_FT:[email or web address]=],[NS_DeliveryAssist1_OBGYN_ALL_OB_FT:[id number]=],[NS_DeliveryRN_OBGYN_ALL_OB_FT:[id number]==]

## 2023-03-08 NOTE — OB PROVIDER LABOR PROGRESS NOTE - ASSESSMENT
CNM OB Progress Note    Patient seen and evaluated at bedside.  Reports painful ctx. Requesting Iv pain meds.     T(C): 36.6 (03-08-23 @ 10:30), Max: 37.1 (03-08-23 @ 04:00)  HR: 80 (03-08-23 @ 10:30) (77 - 93)  BP: 119/79 (03-08-23 @ 10:30) (117/74 - 142/76)  RR: 17 (03-08-23 @ 10:30) (17 - 19)  SpO2: 95% (03-08-23 @ 11:05) (95% - 95%)    SVE: 1/50/-3    A/P 37y P0 @ 39.6wks admitted for IOL for IUGR, AC 3%     -Labor: cat 1   -Fetus: EFW 3344g, IUGR  -Analgesia: n/a  -Induction with: CB and po cytotec     -continue with PO Cytotec  -CB deflated for VE, internal cervical balloon snuggly fit in place, VE 1cm  -reinflated after VE with 60ml  -patient approved for IV Stadol after discussion of IV pain meds Stadol vs Morphine  -patient recently voided, discussed pain intervention and what to expect, patient verbalized understanding    -Continue to monitor with EFM & TOCO  -Recheck patient in 2-4 hours or PRN    Discussed with MD ---    KATHARINE López

## 2023-03-08 NOTE — OB PROVIDER LABOR PROGRESS NOTE - ASSESSMENT
38 yo  at 40 weeks IUGR induction of labor  - cervical balloon placed without incident. pt tolerated well. 60cc/60cc instilled  - cont with PO cytotec   - cont to monitor EFM/toco    d/w Katie elmore PA-C

## 2023-03-08 NOTE — OB NEONATOLOGY/PEDIATRICIAN DELIVERY SUMMARY - NSPEDSNEONOTESA_OBGYN_ALL_OB_FT
NICU resuscitation team and Peds called to delivery for emergency CS for NRFHT with fetal bradycardia to 70s. 40wk female born via emergency primary CS to a 31 y/o  blood type AB+ mother. No significant maternal or prenatal history. PNL -/-/NR/I, GBS - on . SROM at 12:30PM with clear fluids. Baby emerged vigorous, crying, was w/d/s/s with APGARS of 8/9 for color. Mom plans to initiate breastfeeding and formula feed, consents to Hep B vaccine. EOS 0.08. Highest maternal temp 36.8C. Maternal COVID negative.     Physical Exam:  Gen: no acute distress, +grimace  HEENT:  anterior fontanel open soft and flat, nondysmorphic facies, no cleft lip/palate, ears normal set, no ear pits or tags, nares clinically patent, +small superficial laceration of right ear helix  Resp: Normal respiratory effort without grunting or retractions, good air entry b/l, clear to auscultation bilaterally  Cardio: Present S1/S2, regular rate and rhythm, no murmurs  Abd: soft, non tender, non distended, umbilical cord with 3 vessels  Neuro: +palmar and plantar grasp, +suck, +jessica, normal tone  Extremities: negative graham and ortolani maneuvers, moving all extremities, no clavicular crepitus or stepoff  Skin: pink, warm  Genitals: Normal female anatomy, [place] 1, anus patent NICU resuscitation team and Peds called to delivery for emergency CS for NRFHT with fetal bradycardia to 70s. 40wk female born via emergency primary CS to a 31 y/o  blood type AB+ mother. No significant maternal history; prenatal history of IOL for IUGR. PNL -/-/NR/I, GBS - on . SROM at 12:30PM with clear fluids. Baby emerged vigorous, crying, was w/d/s/s with APGARS of 8/9 for color. Mom plans to initiate breastfeeding and formula feed, consents to Hep B vaccine. EOS 0.08. Highest maternal temp 36.8C. Maternal COVID negative.     Physical Exam:  Gen: no acute distress, +grimace  HEENT:  anterior fontanel open soft and flat, nondysmorphic facies, no cleft lip/palate, ears normal set, no ear pits or tags, nares clinically patent, +small superficial laceration of right ear helix  Resp: Normal respiratory effort without grunting or retractions, good air entry b/l, clear to auscultation bilaterally  Cardio: Present S1/S2, regular rate and rhythm, no murmurs  Abd: soft, non tender, non distended, umbilical cord with 3 vessels  Neuro: +palmar and plantar grasp, +suck, +jessica, normal tone  Extremities: negative graham and ortolani maneuvers, moving all extremities, no clavicular crepitus or stepoff  Skin: pink, warm  Genitals: Normal female anatomy, [place] 1, anus patent

## 2023-03-08 NOTE — OB PROVIDER H&P - HISTORY OF PRESENT ILLNESS
R1 Admission H&P    Subjective  HPI: 38yo  at 40w0d presents for IOL for IUGR diagnosed on ultrasound today by abdominal circumference. +FM. -LOF. -CTXs. -VB. Pt denies any other concerns.    PNC: IUGR by AC (patient unsure AC percentile, ultrasound records not seen in EMR). GBS neg (23, ).  EFW 3317 g by sono today.  OBHx:G1   GynHx: denies  PMH: denies  PSH: denies  Meds: PNV, ASA  Allergies: NKDA  Will accept blood transfusions? Yes

## 2023-03-08 NOTE — OB PROVIDER LABOR PROGRESS NOTE - ASSESSMENT
P0 IOL for IUGR with cat II tracing resolving with resuscitative measures  - IVF bolus  - CTM  - Can continue with PO (next dose in 1 hr) if tracing remains cat I    D/w Dr. Roseanne Ann PGY1

## 2023-03-08 NOTE — OB PROVIDER LABOR PROGRESS NOTE - NSVAGINALEXAM_OBGYN_ALL_OB_DT
08-Mar-2023 12:56
08-Mar-2023 15:55
08-Mar-2023 10:54
08-Mar-2023 14:30
08-Mar-2023 04:31
08-Mar-2023 08:23

## 2023-03-08 NOTE — OB PROVIDER DELIVERY SUMMARY - NSPROVIDERDELIVERYNOTE_OBGYN_ALL_OB_FT
Emergent pLTCS for deceleration into 70s w/out recovery  Live born female 8/9, 6#13oz 3110g  Uterus w/ subcentimeter subserosal fibroid - left anterior  b/l fallopian tube and ovaries grossly wnl  hysterotomy closed in 1 layer w/ chromic suture  rectus closed w/ mattress            Amyeo Afroz Jereen, PGY-2

## 2023-03-08 NOTE — OB PROVIDER LABOR PROGRESS NOTE - NS_SUBJECTIVE/OBJECTIVE_OBGYN_ALL_OB_FT
Pt seen at bedside for prolonged deceleration. Maternal repositioning, IVF bolus running. FHT returned to baseline.

## 2023-03-08 NOTE — DISCHARGE NOTE OB - ADDITIONAL INSTRUCTIONS
Follow u[ @ [facility] office in 5-7days for PP BP Check for GHTN  monitor BP @ home 3 times daily (morning/noon/night)  keep a strict blood pressure log and bring to the office 3-4 days after hospital discharge for review  if you have BP > 150/100 call the office  if you have headaches, vision changes, upper abdominal pain call the office

## 2023-03-09 LAB
BASOPHILS # BLD AUTO: 0 K/UL — SIGNIFICANT CHANGE UP (ref 0–0.2)
BASOPHILS # BLD AUTO: 0.05 K/UL — SIGNIFICANT CHANGE UP (ref 0–0.2)
BASOPHILS NFR BLD AUTO: 0 % — SIGNIFICANT CHANGE UP (ref 0–2)
BASOPHILS NFR BLD AUTO: 0.3 % — SIGNIFICANT CHANGE UP (ref 0–2)
EOSINOPHIL # BLD AUTO: 0 K/UL — SIGNIFICANT CHANGE UP (ref 0–0.5)
EOSINOPHIL # BLD AUTO: 0.11 K/UL — SIGNIFICANT CHANGE UP (ref 0–0.5)
EOSINOPHIL NFR BLD AUTO: 0 % — SIGNIFICANT CHANGE UP (ref 0–6)
EOSINOPHIL NFR BLD AUTO: 0.6 % — SIGNIFICANT CHANGE UP (ref 0–6)
HCT VFR BLD CALC: 34.5 % — SIGNIFICANT CHANGE UP (ref 34.5–45)
HCT VFR BLD CALC: 37.9 % — SIGNIFICANT CHANGE UP (ref 34.5–45)
HGB BLD-MCNC: 11.2 G/DL — LOW (ref 11.5–15.5)
HGB BLD-MCNC: 12.4 G/DL — SIGNIFICANT CHANGE UP (ref 11.5–15.5)
IANC: 12.85 K/UL — HIGH (ref 1.8–7.4)
IANC: 15.56 K/UL — HIGH (ref 1.8–7.4)
IMM GRANULOCYTES NFR BLD AUTO: 1.7 % — HIGH (ref 0–0.9)
LYMPHOCYTES # BLD AUTO: 1.71 K/UL — SIGNIFICANT CHANGE UP (ref 1–3.3)
LYMPHOCYTES # BLD AUTO: 16.8 % — SIGNIFICANT CHANGE UP (ref 13–44)
LYMPHOCYTES # BLD AUTO: 2.95 K/UL — SIGNIFICANT CHANGE UP (ref 1–3.3)
LYMPHOCYTES # BLD AUTO: 8.7 % — LOW (ref 13–44)
MANUAL SMEAR VERIFICATION: SIGNIFICANT CHANGE UP
MCHC RBC-ENTMCNC: 30.3 PG — SIGNIFICANT CHANGE UP (ref 27–34)
MCHC RBC-ENTMCNC: 30.8 PG — SIGNIFICANT CHANGE UP (ref 27–34)
MCHC RBC-ENTMCNC: 32.5 GM/DL — SIGNIFICANT CHANGE UP (ref 32–36)
MCHC RBC-ENTMCNC: 32.7 GM/DL — SIGNIFICANT CHANGE UP (ref 32–36)
MCV RBC AUTO: 92.7 FL — SIGNIFICANT CHANGE UP (ref 80–100)
MCV RBC AUTO: 94.8 FL — SIGNIFICANT CHANGE UP (ref 80–100)
MONOCYTES # BLD AUTO: 1.2 K/UL — HIGH (ref 0–0.9)
MONOCYTES # BLD AUTO: 1.28 K/UL — HIGH (ref 0–0.9)
MONOCYTES NFR BLD AUTO: 6.1 % — SIGNIFICANT CHANGE UP (ref 2–14)
MONOCYTES NFR BLD AUTO: 7.3 % — SIGNIFICANT CHANGE UP (ref 2–14)
MYELOCYTES NFR BLD: 0.9 % — HIGH (ref 0–0)
NEUTROPHILS # BLD AUTO: 12.85 K/UL — HIGH (ref 1.8–7.4)
NEUTROPHILS # BLD AUTO: 16.6 K/UL — HIGH (ref 1.8–7.4)
NEUTROPHILS NFR BLD AUTO: 73.3 % — SIGNIFICANT CHANGE UP (ref 43–77)
NEUTROPHILS NFR BLD AUTO: 78.2 % — HIGH (ref 43–77)
NEUTS BAND # BLD: 6.1 % — HIGH (ref 0–6)
NRBC # BLD: 0 /100 WBCS — SIGNIFICANT CHANGE UP (ref 0–0)
NRBC # BLD: 1 /100 — HIGH (ref 0–0)
NRBC # FLD: 0 K/UL — SIGNIFICANT CHANGE UP (ref 0–0)
PLAT MORPH BLD: NORMAL — SIGNIFICANT CHANGE UP
PLATELET # BLD AUTO: 191 K/UL — SIGNIFICANT CHANGE UP (ref 150–400)
PLATELET # BLD AUTO: 201 K/UL — SIGNIFICANT CHANGE UP (ref 150–400)
PLATELET COUNT - ESTIMATE: NORMAL — SIGNIFICANT CHANGE UP
POLYCHROMASIA BLD QL SMEAR: SLIGHT — SIGNIFICANT CHANGE UP
RBC # BLD: 3.64 M/UL — LOW (ref 3.8–5.2)
RBC # BLD: 4.09 M/UL — SIGNIFICANT CHANGE UP (ref 3.8–5.2)
RBC # FLD: 14.2 % — SIGNIFICANT CHANGE UP (ref 10.3–14.5)
RBC # FLD: 14.4 % — SIGNIFICANT CHANGE UP (ref 10.3–14.5)
RBC BLD AUTO: NORMAL — SIGNIFICANT CHANGE UP
WBC # BLD: 17.54 K/UL — HIGH (ref 3.8–10.5)
WBC # BLD: 19.69 K/UL — HIGH (ref 3.8–10.5)
WBC # FLD AUTO: 17.54 K/UL — HIGH (ref 3.8–10.5)
WBC # FLD AUTO: 19.69 K/UL — HIGH (ref 3.8–10.5)

## 2023-03-09 RX ORDER — IBUPROFEN 200 MG
600 TABLET ORAL EVERY 6 HOURS
Refills: 0 | Status: DISCONTINUED | OUTPATIENT
Start: 2023-03-09 | End: 2023-03-11

## 2023-03-09 RX ORDER — ACETAMINOPHEN 500 MG
3 TABLET ORAL
Qty: 0 | Refills: 0 | DISCHARGE
Start: 2023-03-09

## 2023-03-09 RX ADMIN — Medication 600 MILLIGRAM(S): at 17:37

## 2023-03-09 RX ADMIN — Medication 600 MILLIGRAM(S): at 23:17

## 2023-03-09 RX ADMIN — Medication 600 MILLIGRAM(S): at 18:10

## 2023-03-09 RX ADMIN — HEPARIN SODIUM 5000 UNIT(S): 5000 INJECTION INTRAVENOUS; SUBCUTANEOUS at 11:58

## 2023-03-09 RX ADMIN — HEPARIN SODIUM 5000 UNIT(S): 5000 INJECTION INTRAVENOUS; SUBCUTANEOUS at 23:17

## 2023-03-09 RX ADMIN — Medication 975 MILLIGRAM(S): at 09:55

## 2023-03-09 RX ADMIN — Medication 975 MILLIGRAM(S): at 02:06

## 2023-03-09 RX ADMIN — Medication 975 MILLIGRAM(S): at 14:59

## 2023-03-09 RX ADMIN — Medication 975 MILLIGRAM(S): at 21:30

## 2023-03-09 RX ADMIN — Medication 975 MILLIGRAM(S): at 20:46

## 2023-03-09 RX ADMIN — Medication 975 MILLIGRAM(S): at 03:00

## 2023-03-09 RX ADMIN — SIMETHICONE 80 MILLIGRAM(S): 80 TABLET, CHEWABLE ORAL at 20:46

## 2023-03-09 RX ADMIN — Medication 975 MILLIGRAM(S): at 09:21

## 2023-03-09 RX ADMIN — Medication 975 MILLIGRAM(S): at 15:30

## 2023-03-09 RX ADMIN — Medication 30 MILLIGRAM(S): at 12:30

## 2023-03-09 RX ADMIN — Medication 30 MILLIGRAM(S): at 05:19

## 2023-03-09 RX ADMIN — Medication 30 MILLIGRAM(S): at 05:50

## 2023-03-09 RX ADMIN — Medication 30 MILLIGRAM(S): at 11:58

## 2023-03-09 NOTE — PROGRESS NOTE ADULT - ASSESSMENT
Assessment and Plan:   · Assessment	  A/P: 38yo POD#1 s/p pLTCS. emergent, for terminal bradycardia. Course c/b gHTN.  Patient is stable and doing well post-operatively.     GHTN #  - Discussed new diagnosis of GHTN, pp educational materials provided  - BP s overnight: BP x 24 hours: BP:  (109/58 - 145/70)  - HELLP wnl, P/C: 0.2  - No anti-HTN medications indicated at this time    -PP PEC s/s reviewed with patient  -BP cuff Rx sent to home pharmacy  -Patient to take BP TID and keep BP log, to bring BP logg to [facility] office appt for review   -Patient to follow up @ [facility] office in 5-7days after hospital discharge for PP BP check    #PP  - Pain well controlled, continue Motrin, Tylenol, and Oxycodone PRN  - Increase ambulation, SCDs when not ambulating  - CBC reviewed: H/H 11.2/34.5, WBC noted to go up from admission of 13.8 >> 19.6 this AM  - Heparin 5000u BID for prophylaxis    Discharge planning      Discussed with MD Yohan López   Assessment and Plan:   · Assessment	  A/P: 38yo POD#1 s/p pLTCS. emergent, for terminal bradycardia. Course c/b gHTN.  Patient is stable and doing well post-operatively.     -CBC reviewed:   -H/H 11.2/34.5  -WBC noted to go up from admission of 13.8 >> 19.6 this AM   -Repeat CBC ordered now, PENDING results    GHTN #  - Discussed new diagnosis of GHTN, pp educational materials provided  - BP s overnight: BP x 24 hours: BP:  (109/58 - 145/70)  - HELLP wnl, P/C: 0.2  - No anti-HTN medications indicated at this time    -PP PEC s/s reviewed with patient  -Has BP cuff at home  -Patient to take BP TID and keep BP log, to bring BP logg to [facility] office appt for review   -Patient to follow up @ [facility] office in 5-7days after hospital discharge for PP BP check    #PP  - Pain well controlled, continue Motrin, Tylenol, and Oxycodone PRN  - Increase ambulation, SCDs when not ambulating  - Heparin 5000u BID for prophylaxis    Discharge planning      Discussed with MD Yohan López   Assessment and Plan:   · Assessment	  A/P: 38yo POD#1 s/p pLTCS. emergent, for terminal bradycardia. Course c/b gHTN.  Patient is stable and doing well post-operatively.     -CBC reviewed:   -H/H 11.2/34.5  -WBC noted to go up from admission of 13.8 >> 19.6 this AM   -Repeat CBC done     GHTN #  - Discussed new diagnosis of GHTN, pp educational materials provided  - BP s overnight: BP x 24 hours: BP:  (109/58 - 145/70)  - HELLP wnl, P/C: 0.2  - No anti-HTN medications indicated at this time    -PP PEC s/s reviewed with patient  -Has BP cuff at home  -Patient to take BP TID and keep BP log, to bring BP logg to [facility] office appt for review   -Patient to follow up @ [facility] office in 5-7days after hospital discharge for PP BP check    #PP  - Pain well controlled, continue Motrin, Tylenol, and Oxycodone PRN  - Increase ambulation, SCDs when not ambulating  - Heparin 5000u BID for prophylaxis    Discharge planning      ·	ADDENDUM @  2PM:  -Repeat CBC done this afternoon, WBC downtrending, 19.6>>17.5  ·	Repeat CBC ordered for tomorrow AM    Discussed with MD Yohan López

## 2023-03-09 NOTE — PROGRESS NOTE ADULT - ASSESSMENT
A/P: 36yo POD#1 s/p pLTCS. emergent, for terminal bradycardia. Course c/b gHTN.  Patient is stable and doing well post-operatively.     #gHTN  - Discussed new diagnosis with patient including increased risk of developing HTN outside of pregnancy and in subsequent pregnancies  - BP s overnight: 110s-130s/70s-80s  - No si/sx PEC  - HELLP wnl, P/C: 0.2  - CTM BPs  - No anti-HTN medications indicated at this time    #PP  - Pain well controlled, continue Motrin, Tylenol, and Oxycodone PRN  - Increase ambulation, SCDs when not ambulating  - f/u AM CBC  - Heparin 5000u BID for prophylaxis    Jesusita Ann PGY1

## 2023-03-09 NOTE — LACTATION INITIAL EVALUATION - LACTATION INTERVENTIONS
assisted to put baby to both breasts in football hold position with deep latch and baby noted to suck with stimulation/initiate/review safe skin-to-skin/initiate/review hand expression/initiate/review techniques for position and latch/review techniques to manage sore nipples/engorgement/initiate/review breast massage/compression/reviewed components of an effective feeding and at least 8 effective feedings per day required/reviewed risks of unnecessary formula supplementation/reviewed risks of artificial nipples/reviewed benefits and recommendations for rooming in/reviewed feeding on demand/by cue at least 8 times a day/reviewed indications of inadequate milk transfer that would require supplementation

## 2023-03-10 LAB
ALBUMIN SERPL ELPH-MCNC: 3.1 G/DL — LOW (ref 3.3–5)
ALP SERPL-CCNC: 118 U/L — SIGNIFICANT CHANGE UP (ref 40–120)
ALT FLD-CCNC: 18 U/L — SIGNIFICANT CHANGE UP (ref 4–33)
ANION GAP SERPL CALC-SCNC: 7 MMOL/L — SIGNIFICANT CHANGE UP (ref 7–14)
APTT BLD: 27.7 SEC — SIGNIFICANT CHANGE UP (ref 27–36.3)
AST SERPL-CCNC: 31 U/L — SIGNIFICANT CHANGE UP (ref 4–32)
BASOPHILS # BLD AUTO: 0.05 K/UL — SIGNIFICANT CHANGE UP (ref 0–0.2)
BASOPHILS # BLD AUTO: 0.07 K/UL — SIGNIFICANT CHANGE UP (ref 0–0.2)
BASOPHILS NFR BLD AUTO: 0.4 % — SIGNIFICANT CHANGE UP (ref 0–2)
BASOPHILS NFR BLD AUTO: 0.5 % — SIGNIFICANT CHANGE UP (ref 0–2)
BILIRUB SERPL-MCNC: 0.3 MG/DL — SIGNIFICANT CHANGE UP (ref 0.2–1.2)
BUN SERPL-MCNC: 11 MG/DL — SIGNIFICANT CHANGE UP (ref 7–23)
CALCIUM SERPL-MCNC: 8.8 MG/DL — SIGNIFICANT CHANGE UP (ref 8.4–10.5)
CHLORIDE SERPL-SCNC: 103 MMOL/L — SIGNIFICANT CHANGE UP (ref 98–107)
CO2 SERPL-SCNC: 25 MMOL/L — SIGNIFICANT CHANGE UP (ref 22–31)
CREAT SERPL-MCNC: 0.66 MG/DL — SIGNIFICANT CHANGE UP (ref 0.5–1.3)
EGFR: 117 ML/MIN/1.73M2 — SIGNIFICANT CHANGE UP
EOSINOPHIL # BLD AUTO: 0.1 K/UL — SIGNIFICANT CHANGE UP (ref 0–0.5)
EOSINOPHIL # BLD AUTO: 0.17 K/UL — SIGNIFICANT CHANGE UP (ref 0–0.5)
EOSINOPHIL NFR BLD AUTO: 0.7 % — SIGNIFICANT CHANGE UP (ref 0–6)
EOSINOPHIL NFR BLD AUTO: 1.3 % — SIGNIFICANT CHANGE UP (ref 0–6)
FIBRINOGEN PPP-MCNC: 690 MG/DL — HIGH (ref 200–465)
GLUCOSE SERPL-MCNC: 75 MG/DL — SIGNIFICANT CHANGE UP (ref 70–99)
HCT VFR BLD CALC: 33.3 % — LOW (ref 34.5–45)
HCT VFR BLD CALC: 36 % — SIGNIFICANT CHANGE UP (ref 34.5–45)
HGB BLD-MCNC: 10.8 G/DL — LOW (ref 11.5–15.5)
HGB BLD-MCNC: 11.5 G/DL — SIGNIFICANT CHANGE UP (ref 11.5–15.5)
IANC: 11.48 K/UL — HIGH (ref 1.8–7.4)
IANC: 8.74 K/UL — HIGH (ref 1.8–7.4)
IMM GRANULOCYTES NFR BLD AUTO: 1.7 % — HIGH (ref 0–0.9)
IMM GRANULOCYTES NFR BLD AUTO: 1.7 % — HIGH (ref 0–0.9)
INR BLD: 0.93 RATIO — SIGNIFICANT CHANGE UP (ref 0.88–1.16)
LDH SERPL L TO P-CCNC: 283 U/L — HIGH (ref 135–225)
LYMPHOCYTES # BLD AUTO: 14.8 % — SIGNIFICANT CHANGE UP (ref 13–44)
LYMPHOCYTES # BLD AUTO: 2.24 K/UL — SIGNIFICANT CHANGE UP (ref 1–3.3)
LYMPHOCYTES # BLD AUTO: 2.87 K/UL — SIGNIFICANT CHANGE UP (ref 1–3.3)
LYMPHOCYTES # BLD AUTO: 21.8 % — SIGNIFICANT CHANGE UP (ref 13–44)
MCHC RBC-ENTMCNC: 31.1 PG — SIGNIFICANT CHANGE UP (ref 27–34)
MCHC RBC-ENTMCNC: 31.1 PG — SIGNIFICANT CHANGE UP (ref 27–34)
MCHC RBC-ENTMCNC: 31.9 GM/DL — LOW (ref 32–36)
MCHC RBC-ENTMCNC: 32.4 GM/DL — SIGNIFICANT CHANGE UP (ref 32–36)
MCV RBC AUTO: 96 FL — SIGNIFICANT CHANGE UP (ref 80–100)
MCV RBC AUTO: 97.3 FL — SIGNIFICANT CHANGE UP (ref 80–100)
MONOCYTES # BLD AUTO: 1.02 K/UL — HIGH (ref 0–0.9)
MONOCYTES # BLD AUTO: 1.11 K/UL — HIGH (ref 0–0.9)
MONOCYTES NFR BLD AUTO: 6.7 % — SIGNIFICANT CHANGE UP (ref 2–14)
MONOCYTES NFR BLD AUTO: 8.4 % — SIGNIFICANT CHANGE UP (ref 2–14)
NEUTROPHILS # BLD AUTO: 11.48 K/UL — HIGH (ref 1.8–7.4)
NEUTROPHILS # BLD AUTO: 8.74 K/UL — HIGH (ref 1.8–7.4)
NEUTROPHILS NFR BLD AUTO: 66.4 % — SIGNIFICANT CHANGE UP (ref 43–77)
NEUTROPHILS NFR BLD AUTO: 75.6 % — SIGNIFICANT CHANGE UP (ref 43–77)
NRBC # BLD: 0 /100 WBCS — SIGNIFICANT CHANGE UP (ref 0–0)
NRBC # BLD: 0 /100 WBCS — SIGNIFICANT CHANGE UP (ref 0–0)
NRBC # FLD: 0 K/UL — SIGNIFICANT CHANGE UP (ref 0–0)
NRBC # FLD: 0 K/UL — SIGNIFICANT CHANGE UP (ref 0–0)
PLATELET # BLD AUTO: 169 K/UL — SIGNIFICANT CHANGE UP (ref 150–400)
PLATELET # BLD AUTO: 213 K/UL — SIGNIFICANT CHANGE UP (ref 150–400)
POTASSIUM SERPL-MCNC: 4.3 MMOL/L — SIGNIFICANT CHANGE UP (ref 3.5–5.3)
POTASSIUM SERPL-SCNC: 4.3 MMOL/L — SIGNIFICANT CHANGE UP (ref 3.5–5.3)
PROT SERPL-MCNC: 5.7 G/DL — LOW (ref 6–8.3)
PROTHROM AB SERPL-ACNC: 10.3 SEC — LOW (ref 10.5–13.4)
RBC # BLD: 3.47 M/UL — LOW (ref 3.8–5.2)
RBC # BLD: 3.7 M/UL — LOW (ref 3.8–5.2)
RBC # FLD: 14.3 % — SIGNIFICANT CHANGE UP (ref 10.3–14.5)
RBC # FLD: 14.6 % — HIGH (ref 10.3–14.5)
SODIUM SERPL-SCNC: 135 MMOL/L — SIGNIFICANT CHANGE UP (ref 135–145)
URATE SERPL-MCNC: 4.5 MG/DL — SIGNIFICANT CHANGE UP (ref 2.5–7)
WBC # BLD: 13.17 K/UL — HIGH (ref 3.8–10.5)
WBC # BLD: 15.17 K/UL — HIGH (ref 3.8–10.5)
WBC # FLD AUTO: 13.17 K/UL — HIGH (ref 3.8–10.5)
WBC # FLD AUTO: 15.17 K/UL — HIGH (ref 3.8–10.5)

## 2023-03-10 RX ORDER — NIFEDIPINE 30 MG
30 TABLET, EXTENDED RELEASE 24 HR ORAL DAILY
Refills: 0 | Status: DISCONTINUED | OUTPATIENT
Start: 2023-03-10 | End: 2023-03-11

## 2023-03-10 RX ADMIN — Medication 600 MILLIGRAM(S): at 17:47

## 2023-03-10 RX ADMIN — Medication 600 MILLIGRAM(S): at 06:00

## 2023-03-10 RX ADMIN — Medication 30 MILLIGRAM(S): at 15:34

## 2023-03-10 RX ADMIN — SIMETHICONE 80 MILLIGRAM(S): 80 TABLET, CHEWABLE ORAL at 12:26

## 2023-03-10 RX ADMIN — Medication 975 MILLIGRAM(S): at 09:03

## 2023-03-10 RX ADMIN — Medication 600 MILLIGRAM(S): at 12:21

## 2023-03-10 RX ADMIN — Medication 975 MILLIGRAM(S): at 15:28

## 2023-03-10 RX ADMIN — HEPARIN SODIUM 5000 UNIT(S): 5000 INJECTION INTRAVENOUS; SUBCUTANEOUS at 12:21

## 2023-03-10 RX ADMIN — Medication 975 MILLIGRAM(S): at 03:40

## 2023-03-10 RX ADMIN — Medication 600 MILLIGRAM(S): at 00:00

## 2023-03-10 RX ADMIN — MAGNESIUM HYDROXIDE 30 MILLILITER(S): 400 TABLET, CHEWABLE ORAL at 12:26

## 2023-03-10 RX ADMIN — Medication 975 MILLIGRAM(S): at 10:03

## 2023-03-10 RX ADMIN — Medication 600 MILLIGRAM(S): at 18:46

## 2023-03-10 RX ADMIN — Medication 975 MILLIGRAM(S): at 16:28

## 2023-03-10 RX ADMIN — Medication 975 MILLIGRAM(S): at 02:52

## 2023-03-10 RX ADMIN — SIMETHICONE 80 MILLIGRAM(S): 80 TABLET, CHEWABLE ORAL at 05:17

## 2023-03-10 RX ADMIN — Medication 600 MILLIGRAM(S): at 13:21

## 2023-03-10 RX ADMIN — Medication 600 MILLIGRAM(S): at 05:17

## 2023-03-10 NOTE — PROGRESS NOTE ADULT - ASSESSMENT
Assessment and Plan  POD #2 s/p C/S.  Doing well and bonding with baby, good support at bedside  GHTN with recent elevated BP  ·	repeat PEC labs  ·	continue to monitor BP's  ·	Rx procardia 30xl to be held for BP's <120/<70- rx to be sent upon discharge  ·	pt has bp cuff at home, pt to check TID, keep logs, parameters reviewed  ·	PEC precautions reviewed  ·	f/u in office in 3-4 days for bp check  Encourage ambulation.  Incisional care and PO instructions reviewed.  CPC.

## 2023-03-11 VITALS
RESPIRATION RATE: 17 BRPM | HEART RATE: 73 BPM | TEMPERATURE: 98 F | DIASTOLIC BLOOD PRESSURE: 66 MMHG | SYSTOLIC BLOOD PRESSURE: 121 MMHG | OXYGEN SATURATION: 99 %

## 2023-03-11 RX ORDER — NIFEDIPINE 30 MG
1 TABLET, EXTENDED RELEASE 24 HR ORAL
Qty: 30 | Refills: 0
Start: 2023-03-11 | End: 2023-04-09

## 2023-03-11 RX ADMIN — Medication 600 MILLIGRAM(S): at 12:30

## 2023-03-11 RX ADMIN — Medication 975 MILLIGRAM(S): at 05:29

## 2023-03-11 RX ADMIN — Medication 600 MILLIGRAM(S): at 00:21

## 2023-03-11 RX ADMIN — SIMETHICONE 80 MILLIGRAM(S): 80 TABLET, CHEWABLE ORAL at 05:30

## 2023-03-11 RX ADMIN — Medication 600 MILLIGRAM(S): at 01:00

## 2023-03-11 RX ADMIN — Medication 600 MILLIGRAM(S): at 11:42

## 2023-03-11 RX ADMIN — HEPARIN SODIUM 5000 UNIT(S): 5000 INJECTION INTRAVENOUS; SUBCUTANEOUS at 00:20

## 2023-03-11 NOTE — PROGRESS NOTE ADULT - ASSESSMENT
A/P: 38yo POD#3 s/p pLTCS, emergent, for terminal bradycardia. Course c/b gHTN.  Patient is stable and doing well post-operatively.     #gHTN  - BPs overnight 110s-130s/50s-80s  - No si/sx PEC  - HELLP wnl, P/C: 0.2  - Cont Pro 30 XL    #PP  - Pain well controlled, continue Motrin, Tylenol, and Oxycodone PRN  - Increase ambulation, SCDs when not ambulating  - Hct: 34.5 -> 36.0  - Cont reg diet  - Heparin 5000u BID for prophylaxis    Jesusita Sinks PGY1

## 2023-03-11 NOTE — PROGRESS NOTE ADULT - SUBJECTIVE AND OBJECTIVE BOX
Post-Operative Note, C/S  She is a  37y woman who is now post-operative day: 1    Subjective:  The patient feels well.  She is ambulating.   She is tolerating regular diet.  She denies nausea and vomiting; denies fever.  She is voiding.  Her pain is controlled; incisional pain is appropriate.  She reports normal postpartum bleeding.  She is breastfeeding.  She is formula feeding.    Physical exam:    Vital Signs Last 24 Hrs  T(C): 36.6 (09 Mar 2023 06:53), Max: 37.2 (08 Mar 2023 21:43)  T(F): 97.9 (09 Mar 2023 06:53), Max: 99 (08 Mar 2023 21:43)  HR: 74 (09 Mar 2023 06:53) (66 - 120)  BP: 114/67 (09 Mar 2023 06:53) (109/58 - 145/70)  BP(mean): 79 (09 Mar 2023 06:53) (72 - 123)  RR: 18 (09 Mar 2023 06:53) (9 - 26)  SpO2: 100% (09 Mar 2023 06:53) (91% - 100%)    Parameters below as of 09 Mar 2023 06:53  Patient On (Oxygen Delivery Method): room air        Gen: NAD  Breast: Soft, nontender, not engorged.  Abdomen: Soft, nontender, no distension , firm uterine fundus at umbilicus.  Incision: C/D/I.  Pelvic: Normal lochia noted  Ext: No calf tenderness    LABS:                        11.2   19.69 )-----------( 191      ( 09 Mar 2023 05:43 )             34.5       Rubella status:     Allergies    No Known Allergies    Intolerances      MEDICATIONS  (STANDING):  acetaminophen     Tablet .. 975 milliGRAM(s) Oral <User Schedule>  diphtheria/tetanus/pertussis (acellular) Vaccine (Adacel) 0.5 milliLiter(s) IntraMuscular once  heparin   Injectable 5000 Unit(s) SubCutaneous every 12 hours  ibuprofen  Tablet. 600 milliGRAM(s) Oral every 6 hours  ketorolac   Injectable 30 milliGRAM(s) IV Push every 6 hours  lactated ringers Bolus 500 milliLiter(s) IV Bolus once  lactated ringers Bolus 500 milliLiter(s) IV Bolus once  lactated ringers. 1000 milliLiter(s) (125 mL/Hr) IV Continuous <Continuous>  lactated ringers. 1000 milliLiter(s) (75 mL/Hr) IV Continuous <Continuous>  morphine PF Epidural 2 milliGRAM(s) Epidural once  oxytocin Infusion 333.333 milliUNIT(s)/Min (1000 mL/Hr) IV Continuous <Continuous>  oxytocin Infusion 333.333 milliUNIT(s)/Min (1000 mL/Hr) IV Continuous <Continuous>    MEDICATIONS  (PRN):  dexAMETHasone  Injectable 4 milliGRAM(s) IV Push every 6 hours PRN Nausea  diphenhydrAMINE 25 milliGRAM(s) Oral every 6 hours PRN Pruritus  lanolin Ointment 1 Application(s) Topical every 6 hours PRN Sore Nipples  magnesium hydroxide Suspension 30 milliLiter(s) Oral two times a day PRN Constipation  naloxone Injectable 0.1 milliGRAM(s) IV Push every 3 minutes PRN For ANY of the following changes in patient status:  A. Breaths Per Minute LESS THAN 10, B. Oxygen saturation LESS THAN 90%, C. Sedation score of 6 for Stop After: 4 Times  ondansetron Injectable 4 milliGRAM(s) IV Push every 6 hours PRN Nausea  oxyCODONE    IR 5 milliGRAM(s) Oral once PRN Moderate to Severe Pain (4-10)  oxyCODONE    IR 5 milliGRAM(s) Oral every 3 hours PRN Moderate to Severe Pain (4-10)  simethicone 80 milliGRAM(s) Chew every 4 hours PRN Gas            
Pt sitting up without headache
Post-Operative Note, C/S  She is a  37y woman who is now post-operative day: 2    Subjective:  The patient feels well.  She is ambulating.   She is tolerating regular diet.  She denies nausea and vomiting; denies fever.  She is voiding.  Her pain is controlled; incisional pain is appropriate.  She reports normal postpartum bleeding.  She is breastfeeding.  She denies HA, blurry vision, epigastric pain, SOB,CP, dizziness or lightheadedness    Physical exam:    Vital Signs Last 24 Hrs  T(C): 36.6 (10 Mar 2023 09:55), Max: 37.1 (09 Mar 2023 22:09)  T(F): 97.8 (10 Mar 2023 09:55), Max: 98.8 (09 Mar 2023 22:09)  HR: 87 (10 Mar 2023 10:34) (64 - 87)  BP: 114/74 (10 Mar 2023 10:34) (114/74 - 149/97)  BP(mean): --  RR: 18 (10 Mar 2023 09:55) (16 - 18)  SpO2: 100% (10 Mar 2023 09:55) (99% - 100%)    Parameters below as of 10 Mar 2023 09:55  Patient On (Oxygen Delivery Method): room air        Gen: NAD  Breast: Soft, nontender, not engorged.  Abdomen: Soft, nontender, no distension , firm uterine fundus at umbilicus.  Incision: C/D/I.  Pelvic: Normal lochia noted  Ext: No calf tenderness    LABS:                        10.8   13.17 )-----------( 169      ( 10 Mar 2023 05:23 )             33.3         Allergies    No Known Allergies    MEDICATIONS  (STANDING):  acetaminophen     Tablet .. 975 milliGRAM(s) Oral <User Schedule>  diphtheria/tetanus/pertussis (acellular) Vaccine (Adacel) 0.5 milliLiter(s) IntraMuscular once  heparin   Injectable 5000 Unit(s) SubCutaneous every 12 hours  ibuprofen  Tablet. 600 milliGRAM(s) Oral every 6 hours  lactated ringers Bolus 500 milliLiter(s) IV Bolus once  NIFEdipine XL 30 milliGRAM(s) Oral daily  oxytocin Infusion 333.333 milliUNIT(s)/Min (1000 mL/Hr) IV Continuous <Continuous>    MEDICATIONS  (PRN):  diphenhydrAMINE 25 milliGRAM(s) Oral every 6 hours PRN Pruritus  lanolin Ointment 1 Application(s) Topical every 6 hours PRN Sore Nipples  magnesium hydroxide Suspension 30 milliLiter(s) Oral two times a day PRN Constipation  oxyCODONE    IR 5 milliGRAM(s) Oral once PRN Moderate to Severe Pain (4-10)  oxyCODONE    IR 5 milliGRAM(s) Oral every 3 hours PRN Moderate to Severe Pain (4-10)  simethicone 80 milliGRAM(s) Chew every 4 hours PRN Gas              
OB Progress Note:  Delivery, POD#1    S: Patient feels well. Pain is well controlled. She is tolerating clear liquids. Not yet passing flatus. She is ambulating without difficulty. Endorses light vaginal bleeding, soaking < 1 pad/hour. Denies CP/SOB, lightheadedness/dizziness, N/V. Denies headache, visual changes, RUQ pain, worsening edema.    Upon review of BPs, patient met criteria for gHTN by MRBPs > 4 hours apart while on L&D.    MEDICATIONS  (STANDING):  acetaminophen     Tablet .. 975 milliGRAM(s) Oral <User Schedule>  diphtheria/tetanus/pertussis (acellular) Vaccine (Adacel) 0.5 milliLiter(s) IntraMuscular once  heparin   Injectable 5000 Unit(s) SubCutaneous every 12 hours  ibuprofen  Tablet. 600 milliGRAM(s) Oral every 6 hours  ketorolac   Injectable 30 milliGRAM(s) IV Push every 6 hours  lactated ringers Bolus 500 milliLiter(s) IV Bolus once  lactated ringers Bolus 500 milliLiter(s) IV Bolus once  lactated ringers. 1000 milliLiter(s) (125 mL/Hr) IV Continuous <Continuous>  lactated ringers. 1000 milliLiter(s) (75 mL/Hr) IV Continuous <Continuous>  morphine PF Epidural 2 milliGRAM(s) Epidural once  oxytocin Infusion 333.333 milliUNIT(s)/Min (1000 mL/Hr) IV Continuous <Continuous>  oxytocin Infusion 333.333 milliUNIT(s)/Min (1000 mL/Hr) IV Continuous <Continuous>      MEDICATIONS  (PRN):  dexAMETHasone  Injectable 4 milliGRAM(s) IV Push every 6 hours PRN Nausea  diphenhydrAMINE 25 milliGRAM(s) Oral every 6 hours PRN Pruritus  lanolin Ointment 1 Application(s) Topical every 6 hours PRN Sore Nipples  magnesium hydroxide Suspension 30 milliLiter(s) Oral two times a day PRN Constipation  naloxone Injectable 0.1 milliGRAM(s) IV Push every 3 minutes PRN For ANY of the following changes in patient status:  A. Breaths Per Minute LESS THAN 10, B. Oxygen saturation LESS THAN 90%, C. Sedation score of 6 for Stop After: 4 Times  ondansetron Injectable 4 milliGRAM(s) IV Push every 6 hours PRN Nausea  oxyCODONE    IR 5 milliGRAM(s) Oral once PRN Moderate to Severe Pain (4-10)  oxyCODONE    IR 5 milliGRAM(s) Oral every 3 hours PRN Moderate to Severe Pain (4-10)  simethicone 80 milliGRAM(s) Chew every 4 hours PRN Gas      O:  Vitals:  Vital Signs Last 24 Hrs  T(C): 36.6 (09 Mar 2023 02:15), Max: 37.2 (08 Mar 2023 21:43)  T(F): 97.9 (09 Mar 2023 02:15), Max: 99 (08 Mar 2023 21:43)  HR: 66 (09 Mar 2023 02:15) (66 - 120)  BP: 111/68 (09 Mar 2023 02:15) (109/58 - 145/70)  BP(mean): 89 (08 Mar 2023 20:30) (72 - 123)  RR: 18 (09 Mar 2023 02:15) (9 - 26)  SpO2: 99% (09 Mar 2023 02:15) (91% - 100%)    Parameters below as of 09 Mar 2023 02:15  Patient On (Oxygen Delivery Method): room air        Labs:  Blood type: AB Positive  Rubella IgG: RPR: Negative                          12.5   13.85<H> >-----------< 214    (  @ 00:30 )             37.6    23 @ 05:55      135  |  98  |  7   ----------------------------<  50<LL>  3.4<L>   |  20<L>  |  0.49<L>        Ca    9.5      08 Mar 2023 05:55    TPro  6.5  /  Alb  3.8  /  TBili  0.5  /  DBili  x   /  AST  18  /  ALT  15  /  AlkPhos  164<H>  23 @ 05:55          Physical Exam:  General: NAD  Heart: Clinically well-perfused  Lungs: Breathing comfortably on room air  Abdomen: Soft, non-distended, appropriately tender, no RUQ TTP, fundus firm; low transverse incision c/d/i with dermabond in place  Extremities: No calf edema/tenderness
OB Progress Note:  Delivery, POD#3    S: Patient feels well. Pain is well controlled. She is tolerating a regular diet and passing flatus. She is voiding spontaneously and ambulating without difficulty. Endorses light vaginal bleeding, soaking < 1 pad/hour. Denies CP/SOB, lightheadedness/dizziness, N/V.    MEDICATIONS  (STANDING):  acetaminophen     Tablet .. 975 milliGRAM(s) Oral <User Schedule>  diphtheria/tetanus/pertussis (acellular) Vaccine (Adacel) 0.5 milliLiter(s) IntraMuscular once  heparin   Injectable 5000 Unit(s) SubCutaneous every 12 hours  ibuprofen  Tablet. 600 milliGRAM(s) Oral every 6 hours  lactated ringers Bolus 500 milliLiter(s) IV Bolus once  NIFEdipine XL 30 milliGRAM(s) Oral daily  oxytocin Infusion 333.333 milliUNIT(s)/Min (1000 mL/Hr) IV Continuous <Continuous>      MEDICATIONS  (PRN):  diphenhydrAMINE 25 milliGRAM(s) Oral every 6 hours PRN Pruritus  lanolin Ointment 1 Application(s) Topical every 6 hours PRN Sore Nipples  magnesium hydroxide Suspension 30 milliLiter(s) Oral two times a day PRN Constipation  oxyCODONE    IR 5 milliGRAM(s) Oral once PRN Moderate to Severe Pain (4-10)  oxyCODONE    IR 5 milliGRAM(s) Oral every 3 hours PRN Moderate to Severe Pain (4-10)  simethicone 80 milliGRAM(s) Chew every 4 hours PRN Gas      O:  Vitals:  Vital Signs Last 24 Hrs  T(C): 36.4 (11 Mar 2023 06:49), Max: 37.1 (11 Mar 2023 01:49)  T(F): 97.5 (11 Mar 2023 06:49), Max: 98.8 (11 Mar 2023 01:49)  HR: 77 (11 Mar 2023 06:49) (59 - 87)  BP: 118/68 (11 Mar 2023 06:49) (114/74 - 149/97)  BP(mean): 77 (11 Mar 2023 06:49) (77 - 96)  RR: 18 (11 Mar 2023 06:49) (18 - 19)  SpO2: 100% (11 Mar 2023 06:49) (99% - 100%)    Parameters below as of 11 Mar 2023 06:49  Patient On (Oxygen Delivery Method): room air        Labs:  Blood type: AB Positive  Rubella IgG: RPR: Negative                          11.5   15.17<H> >-----------< 213    ( 03-10 @ 11:39 )             36.0                        10.8<L>   13.17<H> >-----------< 169    ( 03-10 @ 05:23 )             33.3<L>                        12.4   17.54<H> >-----------< 201    (  @ 12:07 )             37.9                        11.2<L>   19.69<H> >-----------< 191    (  @ 05:43 )             34.5    03-10-23 @ 11:39      135  |  103  |  11  ----------------------------<  75  4.3   |  25  |  0.66        Ca    8.8      10 Mar 2023 11:39    TPro  5.7<L>  /  Alb  3.1<L>  /  TBili  0.3  /  DBili  x   /  AST  31  /  ALT  18  /  AlkPhos  118  03-10-23 @ 11:39          Physical Exam:  General: NAD  Heart: Clinically well-perfused  Lungs: Breathing comfortably on room air  Abdomen: Soft, non-distended, appropriately tender, fundus firm; low transverse incision c/d/i  Extremities: No calf edema/tenderness

## 2023-04-10 LAB — SURGICAL PATHOLOGY STUDY: SIGNIFICANT CHANGE UP

## 2024-11-15 ENCOUNTER — NON-APPOINTMENT (OUTPATIENT)
Age: 39
End: 2024-11-15

## 2024-11-15 ENCOUNTER — APPOINTMENT (OUTPATIENT)
Dept: OBGYN | Facility: CLINIC | Age: 39
End: 2024-11-15

## 2024-11-15 VITALS
HEART RATE: 79 BPM | WEIGHT: 143 LBS | OXYGEN SATURATION: 98 % | SYSTOLIC BLOOD PRESSURE: 136 MMHG | BODY MASS INDEX: 26.16 KG/M2 | DIASTOLIC BLOOD PRESSURE: 90 MMHG

## 2024-11-15 DIAGNOSIS — O13.9 GESTATIONAL [PREGNANCY-INDUCED] HYPERTENSION W/OUT SIGNIFICANT PROTEINURIA, UNSPECIFIED TRIMESTER: ICD-10-CM

## 2024-11-15 DIAGNOSIS — Z01.419 ENCOUNTER FOR GYNECOLOGICAL EXAMINATION (GENERAL) (ROUTINE) W/OUT ABNORMAL FINDINGS: ICD-10-CM

## 2024-11-15 DIAGNOSIS — N92.6 IRREGULAR MENSTRUATION, UNSPECIFIED: ICD-10-CM

## 2024-11-15 DIAGNOSIS — Z36.89 ENCOUNTER FOR OTHER SPECIFIED ANTENATAL SCREENING: ICD-10-CM

## 2024-11-15 DIAGNOSIS — Z11.3 ENCOUNTER FOR SCREENING FOR INFECTIONS WITH A PREDOMINANTLY SEXUAL MODE OF TRANSMISSION: ICD-10-CM

## 2024-11-15 PROCEDURE — 99385 PREV VISIT NEW AGE 18-39: CPT | Mod: 25

## 2024-11-15 PROCEDURE — 76830 TRANSVAGINAL US NON-OB: CPT

## 2024-11-15 PROCEDURE — 99459 PELVIC EXAMINATION: CPT

## 2024-11-18 LAB
C TRACH RRNA SPEC QL NAA+PROBE: NOT DETECTED
HPV HIGH+LOW RISK DNA PNL CVX: NOT DETECTED
N GONORRHOEA RRNA SPEC QL NAA+PROBE: NOT DETECTED
SOURCE TP AMPLIFICATION: NORMAL

## 2024-11-21 ENCOUNTER — TRANSCRIPTION ENCOUNTER (OUTPATIENT)
Age: 39
End: 2024-11-21

## 2024-11-21 LAB — CYTOLOGY CVX/VAG DOC THIN PREP: NORMAL

## 2024-11-25 ENCOUNTER — TRANSCRIPTION ENCOUNTER (OUTPATIENT)
Age: 39
End: 2024-11-25

## 2024-11-27 ENCOUNTER — APPOINTMENT (OUTPATIENT)
Dept: OBGYN | Facility: CLINIC | Age: 39
End: 2024-11-27
Payer: COMMERCIAL

## 2024-11-27 VITALS
WEIGHT: 144 LBS | BODY MASS INDEX: 26.34 KG/M2 | OXYGEN SATURATION: 100 % | DIASTOLIC BLOOD PRESSURE: 88 MMHG | SYSTOLIC BLOOD PRESSURE: 126 MMHG | HEART RATE: 84 BPM

## 2024-11-27 DIAGNOSIS — N92.6 IRREGULAR MENSTRUATION, UNSPECIFIED: ICD-10-CM

## 2024-11-27 DIAGNOSIS — N93.9 ABNORMAL UTERINE AND VAGINAL BLEEDING, UNSPECIFIED: ICD-10-CM

## 2024-11-27 DIAGNOSIS — Z13.79 ENCOUNTER FOR OTHER SCREENING FOR GENETIC AND CHROMOSOMAL ANOMALIES: ICD-10-CM

## 2024-11-27 DIAGNOSIS — Z36.89 ENCOUNTER FOR OTHER SPECIFIED ANTENATAL SCREENING: ICD-10-CM

## 2024-11-27 DIAGNOSIS — Z14.8 GENETIC CARRIER OF OTHER DISEASE: ICD-10-CM

## 2024-11-27 PROCEDURE — 99214 OFFICE O/P EST MOD 30 MIN: CPT

## 2024-11-27 PROCEDURE — 36415 COLL VENOUS BLD VENIPUNCTURE: CPT

## 2024-12-13 ENCOUNTER — APPOINTMENT (OUTPATIENT)
Dept: OBGYN | Facility: CLINIC | Age: 39
End: 2024-12-13
Payer: COMMERCIAL

## 2024-12-13 VITALS
OXYGEN SATURATION: 98 % | HEART RATE: 92 BPM | HEIGHT: 62 IN | WEIGHT: 148.44 LBS | DIASTOLIC BLOOD PRESSURE: 84 MMHG | BODY MASS INDEX: 27.32 KG/M2 | SYSTOLIC BLOOD PRESSURE: 136 MMHG

## 2024-12-13 DIAGNOSIS — O09.521 SUPERVISION OF ELDERLY MULTIGRAVIDA, FIRST TRIMESTER: ICD-10-CM

## 2024-12-13 PROCEDURE — 0502F SUBSEQUENT PRENATAL CARE: CPT

## 2024-12-15 LAB
ALBUMIN SERPL ELPH-MCNC: 4.4 G/DL
ALP BLD-CCNC: 62 U/L
ALT SERPL-CCNC: 10 U/L
ANION GAP SERPL CALC-SCNC: 13 MMOL/L
AST SERPL-CCNC: 12 U/L
BACTERIA UR CULT: NORMAL
BILIRUB SERPL-MCNC: 0.2 MG/DL
BUN SERPL-MCNC: 7 MG/DL
CALCIUM SERPL-MCNC: 9.4 MG/DL
CHLORIDE SERPL-SCNC: 103 MMOL/L
CMV IGG SERPL QL: <0.2 U/ML
CMV IGG SERPL-IMP: NEGATIVE
CMV IGM SERPL QL: <8 AU/ML
CMV IGM SERPL QL: NEGATIVE
CO2 SERPL-SCNC: 23 MMOL/L
CREAT SERPL-MCNC: 0.48 MG/DL
EGFR: 125 ML/MIN/1.73M2
ESTIMATED AVERAGE GLUCOSE: 103 MG/DL
GLUCOSE SERPL-MCNC: 78 MG/DL
HBA1C MFR BLD HPLC: 5.2 %
HBV SURFACE AG SER QL: NONREACTIVE
HCT VFR BLD CALC: 41.3 %
HCV AB SER QL: NONREACTIVE
HCV RNA SERPL NAA+PROBE-LOG IU: NOT DETECTED LOGIU/ML
HCV S/CO RATIO: 0.08 S/CO
HEPC RNA INTERP: NOT DETECTED
HGB BLD-MCNC: 13.2 G/DL
HIV1+2 AB SPEC QL IA.RAPID: NONREACTIVE
MCHC RBC-ENTMCNC: 30.4 PG
MCHC RBC-ENTMCNC: 32 G/DL
MCV RBC AUTO: 95.2 FL
MEV IGG FLD QL IA: 242 AU/ML
MEV IGG+IGM SER-IMP: POSITIVE
MUV AB SER-ACNC: POSITIVE
MUV IGG SER QL IA: 19.1 AU/ML
PLATELET # BLD AUTO: 259 K/UL
POTASSIUM SERPL-SCNC: 4.2 MMOL/L
PROT SERPL-MCNC: 6.9 G/DL
RBC # BLD: 4.34 M/UL
RBC # FLD: 13.1 %
RUBV IGG FLD-ACNC: 8.46 INDEX
RUBV IGG SER-IMP: POSITIVE
SODIUM SERPL-SCNC: 139 MMOL/L
T GONDII AB SER-IMP: NEGATIVE
T GONDII AB SER-IMP: NEGATIVE
T GONDII IGG SER QL: <3 IU/ML
T GONDII IGM SER QL: <3 AU/ML
TSH SERPL-ACNC: 2.27 UIU/ML
VZV AB TITR SER: POSITIVE
VZV IGG SER IF-ACNC: 14 S/CO
WBC # FLD AUTO: 10.15 K/UL

## 2024-12-16 ENCOUNTER — NON-APPOINTMENT (OUTPATIENT)
Age: 39
End: 2024-12-16

## 2024-12-17 ENCOUNTER — APPOINTMENT (OUTPATIENT)
Dept: ANTEPARTUM | Facility: CLINIC | Age: 39
End: 2024-12-17
Payer: COMMERCIAL

## 2024-12-17 ENCOUNTER — ASOB RESULT (OUTPATIENT)
Age: 39
End: 2024-12-17

## 2024-12-17 PROCEDURE — 76813 OB US NUCHAL MEAS 1 GEST: CPT

## 2024-12-17 PROCEDURE — 76801 OB US < 14 WKS SINGLE FETUS: CPT

## 2024-12-17 PROCEDURE — 93976 VASCULAR STUDY: CPT

## 2024-12-19 LAB
ABO + RH PNL BLD: NORMAL
B19V IGG SER QL IA: 6.17 INDEX
B19V IGG+IGM SER-IMP: NORMAL
B19V IGG+IGM SER-IMP: POSITIVE
B19V IGM FLD-ACNC: 0.19 INDEX
B19V IGM SER-ACNC: NEGATIVE
BLD GP AB SCN SERPL QL: NORMAL
C TRACH RRNA SPEC QL NAA+PROBE: NOT DETECTED
HGB A MFR BLD: 97.5 %
HGB A2 MFR BLD: 2.5 %
HGB FRACT BLD-IMP: NORMAL
N GONORRHOEA RRNA SPEC QL NAA+PROBE: NOT DETECTED
SOURCE AMPLIFICATION: NORMAL
T PALLIDUM AB SER QL IA: NEGATIVE

## 2025-01-02 NOTE — OB PROVIDER DELIVERY SUMMARY - NSABNHEARTRATE_OBGYN_ALL_OB
PA for clonazePAM (KlonoPIN) 2 mg tablet CANCELLED due to     Called [facility] to follow up with this PA request, per Prompt PA message. Representative (Chinyere) informed me that they have a PA on file that is valid until March 2025.  I asked if a fax could be sent for our chart, she stated they do not have that - it is just noted.    [x]Approval on file-dates approved through March 2025  []Medication already on Formulary  []Brand Name Preferred  []Patient no longer covered by insurance    Patient advised by     [x]My Chart Message  [x]Phone call    Message sent to office clinical pool   Yes    Scanned into Media  no   
Abnormal Fetal Heart Rate Category II
sudden onset

## 2025-01-10 ENCOUNTER — APPOINTMENT (OUTPATIENT)
Dept: OBGYN | Facility: CLINIC | Age: 40
End: 2025-01-10
Payer: COMMERCIAL

## 2025-01-10 PROCEDURE — 0502F SUBSEQUENT PRENATAL CARE: CPT

## 2025-01-17 ENCOUNTER — ASOB RESULT (OUTPATIENT)
Age: 40
End: 2025-01-17

## 2025-01-17 ENCOUNTER — APPOINTMENT (OUTPATIENT)
Dept: ANTEPARTUM | Facility: CLINIC | Age: 40
End: 2025-01-17
Payer: COMMERCIAL

## 2025-01-17 PROCEDURE — 76817 TRANSVAGINAL US OBSTETRIC: CPT

## 2025-01-17 PROCEDURE — 76805 OB US >/= 14 WKS SNGL FETUS: CPT

## 2025-02-05 ENCOUNTER — NON-APPOINTMENT (OUTPATIENT)
Age: 40
End: 2025-02-05

## 2025-02-05 ENCOUNTER — APPOINTMENT (OUTPATIENT)
Dept: OBGYN | Facility: CLINIC | Age: 40
End: 2025-02-05
Payer: COMMERCIAL

## 2025-02-05 VITALS
HEART RATE: 95 BPM | HEIGHT: 62 IN | WEIGHT: 149 LBS | OXYGEN SATURATION: 97 % | DIASTOLIC BLOOD PRESSURE: 89 MMHG | BODY MASS INDEX: 27.42 KG/M2 | SYSTOLIC BLOOD PRESSURE: 128 MMHG

## 2025-02-05 PROCEDURE — 0502F SUBSEQUENT PRENATAL CARE: CPT

## 2025-02-14 ENCOUNTER — APPOINTMENT (OUTPATIENT)
Dept: ANTEPARTUM | Facility: CLINIC | Age: 40
End: 2025-02-14

## 2025-02-14 ENCOUNTER — ASOB RESULT (OUTPATIENT)
Age: 40
End: 2025-02-14

## 2025-02-14 PROCEDURE — 76811 OB US DETAILED SNGL FETUS: CPT

## 2025-02-14 PROCEDURE — 76817 TRANSVAGINAL US OBSTETRIC: CPT | Mod: 59

## 2025-02-20 ENCOUNTER — ASOB RESULT (OUTPATIENT)
Age: 40
End: 2025-02-20

## 2025-02-20 ENCOUNTER — APPOINTMENT (OUTPATIENT)
Dept: ANTEPARTUM | Facility: CLINIC | Age: 40
End: 2025-02-20
Payer: COMMERCIAL

## 2025-02-20 PROCEDURE — 76816 OB US FOLLOW-UP PER FETUS: CPT

## 2025-02-26 ENCOUNTER — NON-APPOINTMENT (OUTPATIENT)
Age: 40
End: 2025-02-26

## 2025-03-05 ENCOUNTER — APPOINTMENT (OUTPATIENT)
Dept: OBGYN | Facility: CLINIC | Age: 40
End: 2025-03-05
Payer: COMMERCIAL

## 2025-03-05 PROCEDURE — 0502F SUBSEQUENT PRENATAL CARE: CPT

## 2025-03-13 ENCOUNTER — APPOINTMENT (OUTPATIENT)
Dept: ANTEPARTUM | Facility: CLINIC | Age: 40
End: 2025-03-13
Payer: COMMERCIAL

## 2025-03-13 ENCOUNTER — ASOB RESULT (OUTPATIENT)
Age: 40
End: 2025-03-13

## 2025-03-13 PROCEDURE — 76819 FETAL BIOPHYS PROFIL W/O NST: CPT | Mod: 59

## 2025-03-13 PROCEDURE — 76820 UMBILICAL ARTERY ECHO: CPT | Mod: 59

## 2025-03-13 PROCEDURE — 76816 OB US FOLLOW-UP PER FETUS: CPT

## 2025-03-18 ENCOUNTER — APPOINTMENT (OUTPATIENT)
Dept: OBGYN | Facility: CLINIC | Age: 40
End: 2025-03-18
Payer: COMMERCIAL

## 2025-03-18 DIAGNOSIS — O09.522 SUPERVISION OF ELDERLY MULTIGRAVIDA, SECOND TRIMESTER: ICD-10-CM

## 2025-03-18 PROBLEM — Z13.1 SPECIAL SCREENING EXAMINATION FOR DIABETES MELLITUS: Status: ACTIVE | Noted: 2025-03-18

## 2025-03-18 PROCEDURE — 36415 COLL VENOUS BLD VENIPUNCTURE: CPT

## 2025-03-18 PROCEDURE — 0502F SUBSEQUENT PRENATAL CARE: CPT

## 2025-03-19 ENCOUNTER — TRANSCRIPTION ENCOUNTER (OUTPATIENT)
Age: 40
End: 2025-03-19

## 2025-03-19 LAB
GLUCOSE 1H P 50 G GLC PO SERPL-MCNC: 158 MG/DL
HCT VFR BLD CALC: 35.9 %
HGB BLD-MCNC: 11.3 G/DL
MCHC RBC-ENTMCNC: 30.6 PG
MCHC RBC-ENTMCNC: 31.5 G/DL
MCV RBC AUTO: 97.3 FL
PLATELET # BLD AUTO: 214 K/UL
RBC # BLD: 3.69 M/UL
RBC # FLD: 14.1 %
T PALLIDUM AB SER QL IA: NEGATIVE
WBC # FLD AUTO: 11 K/UL

## 2025-03-28 ENCOUNTER — APPOINTMENT (OUTPATIENT)
Dept: OBGYN | Facility: CLINIC | Age: 40
End: 2025-03-28
Payer: COMMERCIAL

## 2025-03-28 DIAGNOSIS — Z13.1 ENCOUNTER FOR SCREENING FOR DIABETES MELLITUS: ICD-10-CM

## 2025-03-28 PROCEDURE — 36415 COLL VENOUS BLD VENIPUNCTURE: CPT

## 2025-04-15 ENCOUNTER — APPOINTMENT (OUTPATIENT)
Dept: OBGYN | Facility: CLINIC | Age: 40
End: 2025-04-15
Payer: COMMERCIAL

## 2025-04-15 VITALS
DIASTOLIC BLOOD PRESSURE: 80 MMHG | SYSTOLIC BLOOD PRESSURE: 120 MMHG | WEIGHT: 157 LBS | OXYGEN SATURATION: 98 % | HEART RATE: 86 BPM | BODY MASS INDEX: 28.72 KG/M2

## 2025-04-15 PROCEDURE — 90471 IMMUNIZATION ADMIN: CPT

## 2025-04-15 PROCEDURE — 0502F SUBSEQUENT PRENATAL CARE: CPT

## 2025-04-15 PROCEDURE — 90715 TDAP VACCINE 7 YRS/> IM: CPT

## 2025-04-24 ENCOUNTER — APPOINTMENT (OUTPATIENT)
Dept: ANTEPARTUM | Facility: CLINIC | Age: 40
End: 2025-04-24

## 2025-05-05 ENCOUNTER — TRANSCRIPTION ENCOUNTER (OUTPATIENT)
Age: 40
End: 2025-05-05

## 2025-05-05 ENCOUNTER — NON-APPOINTMENT (OUTPATIENT)
Age: 40
End: 2025-05-05

## 2025-05-07 ENCOUNTER — APPOINTMENT (OUTPATIENT)
Dept: OBGYN | Facility: CLINIC | Age: 40
End: 2025-05-07
Payer: COMMERCIAL

## 2025-05-07 VITALS
WEIGHT: 160 LBS | HEART RATE: 102 BPM | DIASTOLIC BLOOD PRESSURE: 77 MMHG | OXYGEN SATURATION: 96 % | BODY MASS INDEX: 29.26 KG/M2 | SYSTOLIC BLOOD PRESSURE: 121 MMHG

## 2025-05-07 PROCEDURE — 0502F SUBSEQUENT PRENATAL CARE: CPT

## 2025-05-21 ENCOUNTER — NON-APPOINTMENT (OUTPATIENT)
Age: 40
End: 2025-05-21

## 2025-05-28 ENCOUNTER — APPOINTMENT (OUTPATIENT)
Dept: OBGYN | Facility: CLINIC | Age: 40
End: 2025-05-28
Payer: COMMERCIAL

## 2025-05-28 DIAGNOSIS — O09.523 SUPERVISION OF ELDERLY MULTIGRAVIDA, THIRD TRIMESTER: ICD-10-CM

## 2025-05-28 PROCEDURE — 36415 COLL VENOUS BLD VENIPUNCTURE: CPT

## 2025-05-28 PROCEDURE — 0502F SUBSEQUENT PRENATAL CARE: CPT

## 2025-06-02 LAB
B-HEM STREP SPEC QL CULT: NORMAL
BASOPHILS # BLD AUTO: 0.05 K/UL
BASOPHILS NFR BLD AUTO: 0.4 %
EOSINOPHIL # BLD AUTO: 0.14 K/UL
EOSINOPHIL NFR BLD AUTO: 1.2 %
HBV SURFACE AG SER QL: NONREACTIVE
HCT VFR BLD CALC: 33.8 %
HCV AB SER QL: NONREACTIVE
HCV S/CO RATIO: 0.06 S/CO
HGB BLD-MCNC: 10.8 G/DL
HIV1+2 AB SPEC QL IA.RAPID: NONREACTIVE
IMM GRANULOCYTES NFR BLD AUTO: 3.2 %
LYMPHOCYTES # BLD AUTO: 2.16 K/UL
LYMPHOCYTES NFR BLD AUTO: 18.7 %
MAN DIFF?: NORMAL
MCHC RBC-ENTMCNC: 28.9 PG
MCHC RBC-ENTMCNC: 32 G/DL
MCV RBC AUTO: 90.4 FL
MONOCYTES # BLD AUTO: 1.27 K/UL
MONOCYTES NFR BLD AUTO: 11 %
NEUTROPHILS # BLD AUTO: 7.58 K/UL
NEUTROPHILS NFR BLD AUTO: 65.5 %
PLATELET # BLD AUTO: 235 K/UL
RBC # BLD: 3.74 M/UL
RBC # FLD: 14 %
T PALLIDUM AB SER QL IA: NEGATIVE
WBC # FLD AUTO: 11.57 K/UL

## 2025-06-05 ENCOUNTER — APPOINTMENT (OUTPATIENT)
Dept: ANTEPARTUM | Facility: CLINIC | Age: 40
End: 2025-06-05
Payer: COMMERCIAL

## 2025-06-05 ENCOUNTER — ASOB RESULT (OUTPATIENT)
Age: 40
End: 2025-06-05

## 2025-06-05 PROCEDURE — 76820 UMBILICAL ARTERY ECHO: CPT | Mod: 59

## 2025-06-05 PROCEDURE — 76819 FETAL BIOPHYS PROFIL W/O NST: CPT | Mod: 59

## 2025-06-05 PROCEDURE — 76816 OB US FOLLOW-UP PER FETUS: CPT

## 2025-06-06 ENCOUNTER — APPOINTMENT (OUTPATIENT)
Dept: OBGYN | Facility: CLINIC | Age: 40
End: 2025-06-06

## 2025-06-06 VITALS
HEIGHT: 62 IN | DIASTOLIC BLOOD PRESSURE: 70 MMHG | BODY MASS INDEX: 29.81 KG/M2 | SYSTOLIC BLOOD PRESSURE: 114 MMHG | HEART RATE: 90 BPM | OXYGEN SATURATION: 96 % | WEIGHT: 162 LBS

## 2025-06-06 PROCEDURE — 81003 URINALYSIS AUTO W/O SCOPE: CPT | Mod: QW

## 2025-06-06 PROCEDURE — 0502F SUBSEQUENT PRENATAL CARE: CPT

## 2025-06-13 ENCOUNTER — ASOB RESULT (OUTPATIENT)
Age: 40
End: 2025-06-13

## 2025-06-13 ENCOUNTER — APPOINTMENT (OUTPATIENT)
Dept: ANTEPARTUM | Facility: CLINIC | Age: 40
End: 2025-06-13
Payer: COMMERCIAL

## 2025-06-13 PROCEDURE — 76820 UMBILICAL ARTERY ECHO: CPT

## 2025-06-13 PROCEDURE — 76819 FETAL BIOPHYS PROFIL W/O NST: CPT

## 2025-06-13 PROCEDURE — 76815 OB US LIMITED FETUS(S): CPT

## 2025-06-15 PROBLEM — N93.9 VAGINAL SPOTTING: Status: RESOLVED | Noted: 2024-11-27 | Resolved: 2025-06-15

## 2025-06-15 PROBLEM — Z23 ENCOUNTER FOR IMMUNIZATION: Status: RESOLVED | Noted: 2021-10-25 | Resolved: 2025-06-15

## 2025-06-15 PROBLEM — N92.6 MISSED MENSES: Status: RESOLVED | Noted: 2024-11-27 | Resolved: 2025-06-15

## 2025-06-16 ENCOUNTER — APPOINTMENT (OUTPATIENT)
Dept: OBGYN | Facility: CLINIC | Age: 40
End: 2025-06-16

## 2025-06-16 VITALS
HEART RATE: 100 BPM | WEIGHT: 163 LBS | BODY MASS INDEX: 30 KG/M2 | DIASTOLIC BLOOD PRESSURE: 77 MMHG | SYSTOLIC BLOOD PRESSURE: 113 MMHG | OXYGEN SATURATION: 98 % | HEIGHT: 62 IN

## 2025-06-16 PROBLEM — Z01.818 PREOP TESTING: Status: ACTIVE | Noted: 2025-06-16

## 2025-06-16 PROCEDURE — 0502F SUBSEQUENT PRENATAL CARE: CPT

## 2025-06-20 ENCOUNTER — APPOINTMENT (OUTPATIENT)
Dept: ANTEPARTUM | Facility: CLINIC | Age: 40
End: 2025-06-20

## 2025-06-23 LAB
ALBUMIN SERPL ELPH-MCNC: 3.7 G/DL
ALP BLD-CCNC: 155 U/L
ALT SERPL-CCNC: 15 U/L
ANION GAP SERPL CALC-SCNC: 15 MMOL/L
AST SERPL-CCNC: 19 U/L
BILIRUB SERPL-MCNC: 0.3 MG/DL
BUN SERPL-MCNC: 10 MG/DL
CALCIUM SERPL-MCNC: 9 MG/DL
CHLORIDE SERPL-SCNC: 103 MMOL/L
CO2 SERPL-SCNC: 19 MMOL/L
CREAT SERPL-MCNC: 0.68 MG/DL
EGFRCR SERPLBLD CKD-EPI 2021: 115 ML/MIN/1.73M2
GLUCOSE SERPL-MCNC: 94 MG/DL
POTASSIUM SERPL-SCNC: 4.2 MMOL/L
PROT SERPL-MCNC: 5.9 G/DL
SODIUM SERPL-SCNC: 137 MMOL/L

## 2025-06-24 LAB
ABORH: NORMAL
ANTIBODY SCREEN: NORMAL
APTT BLD: 25.8 SEC
BASOPHILS # BLD AUTO: 0.03 K/UL
BASOPHILS NFR BLD AUTO: 0.3 %
EOSINOPHIL # BLD AUTO: 0.07 K/UL
EOSINOPHIL NFR BLD AUTO: 0.7 %
HCT VFR BLD CALC: 33.7 %
HGB BLD-MCNC: 10.3 G/DL
IMM GRANULOCYTES NFR BLD AUTO: 2.7 %
INR PPP: 0.9 RATIO
LYMPHOCYTES # BLD AUTO: 2.36 K/UL
LYMPHOCYTES NFR BLD AUTO: 23.5 %
MAN DIFF?: NORMAL
MCHC RBC-ENTMCNC: 28.7 PG
MCHC RBC-ENTMCNC: 30.6 G/DL
MCV RBC AUTO: 93.9 FL
MONOCYTES # BLD AUTO: 0.93 K/UL
MONOCYTES NFR BLD AUTO: 9.3 %
NEUTROPHILS # BLD AUTO: 6.38 K/UL
NEUTROPHILS NFR BLD AUTO: 63.5 %
PLATELET # BLD AUTO: 207 K/UL
PT BLD: 10.6 SEC
RBC # BLD: 3.59 M/UL
RBC # FLD: 14.8 %
WBC # FLD AUTO: 10.04 K/UL

## 2025-06-26 ENCOUNTER — INPATIENT (INPATIENT)
Facility: HOSPITAL | Age: 40
LOS: 2 days | Discharge: HOME | End: 2025-06-29
Attending: OBSTETRICS & GYNECOLOGY | Admitting: OBSTETRICS & GYNECOLOGY
Payer: COMMERCIAL

## 2025-06-26 VITALS
RESPIRATION RATE: 18 BRPM | SYSTOLIC BLOOD PRESSURE: 125 MMHG | WEIGHT: 162.04 LBS | TEMPERATURE: 97 F | HEART RATE: 92 BPM | DIASTOLIC BLOOD PRESSURE: 85 MMHG | HEIGHT: 62 IN

## 2025-06-26 DIAGNOSIS — Z98.891 HISTORY OF UTERINE SCAR FROM PREVIOUS SURGERY: Chronic | ICD-10-CM

## 2025-06-26 LAB
BASOPHILS # BLD AUTO: 0.06 K/UL — SIGNIFICANT CHANGE UP (ref 0–0.2)
BASOPHILS NFR BLD AUTO: 0.5 % — SIGNIFICANT CHANGE UP (ref 0–2)
BLD GP AB SCN SERPL QL: NEGATIVE — SIGNIFICANT CHANGE UP
EOSINOPHIL # BLD AUTO: 0.11 K/UL — SIGNIFICANT CHANGE UP (ref 0–0.5)
EOSINOPHIL NFR BLD AUTO: 1 % — SIGNIFICANT CHANGE UP (ref 0–6)
HCT VFR BLD CALC: 35.5 % — SIGNIFICANT CHANGE UP (ref 34.5–45)
HGB BLD-MCNC: 11.3 G/DL — LOW (ref 11.5–15.5)
IMM GRANULOCYTES # BLD AUTO: 0.41 K/UL — HIGH (ref 0–0.07)
IMM GRANULOCYTES NFR BLD AUTO: 3.6 % — HIGH (ref 0–0.9)
LYMPHOCYTES # BLD AUTO: 3.28 K/UL — SIGNIFICANT CHANGE UP (ref 1–3.3)
LYMPHOCYTES NFR BLD AUTO: 28.9 % — SIGNIFICANT CHANGE UP (ref 13–44)
MCHC RBC-ENTMCNC: 28.6 PG — SIGNIFICANT CHANGE UP (ref 27–34)
MCHC RBC-ENTMCNC: 31.8 G/DL — LOW (ref 32–36)
MCV RBC AUTO: 89.9 FL — SIGNIFICANT CHANGE UP (ref 80–100)
MONOCYTES # BLD AUTO: 0.98 K/UL — HIGH (ref 0–0.9)
MONOCYTES NFR BLD AUTO: 8.6 % — SIGNIFICANT CHANGE UP (ref 2–14)
NEUTROPHILS # BLD AUTO: 6.49 K/UL — SIGNIFICANT CHANGE UP (ref 1.8–7.4)
NEUTROPHILS NFR BLD AUTO: 57.4 % — SIGNIFICANT CHANGE UP (ref 43–77)
NRBC # BLD AUTO: 0 K/UL — SIGNIFICANT CHANGE UP (ref 0–0)
NRBC # FLD: 0 K/UL — SIGNIFICANT CHANGE UP (ref 0–0)
NRBC BLD AUTO-RTO: 0 /100 WBCS — SIGNIFICANT CHANGE UP (ref 0–0)
PLATELET # BLD AUTO: 208 K/UL — SIGNIFICANT CHANGE UP (ref 150–400)
PMV BLD: 10.2 FL — SIGNIFICANT CHANGE UP (ref 7–13)
RBC # BLD: 3.95 M/UL — SIGNIFICANT CHANGE UP (ref 3.8–5.2)
RBC # FLD: 14.2 % — SIGNIFICANT CHANGE UP (ref 10.3–14.5)
RH IG SCN BLD-IMP: POSITIVE — SIGNIFICANT CHANGE UP
RH IG SCN BLD-IMP: POSITIVE — SIGNIFICANT CHANGE UP
WBC # BLD: 11.33 K/UL — HIGH (ref 3.8–10.5)
WBC # FLD AUTO: 11.33 K/UL — HIGH (ref 3.8–10.5)

## 2025-06-26 PROCEDURE — 59510 CESAREAN DELIVERY: CPT

## 2025-06-26 PROCEDURE — 36415 COLL VENOUS BLD VENIPUNCTURE: CPT

## 2025-06-26 PROCEDURE — 85025 COMPLETE CBC W/AUTO DIFF WBC: CPT

## 2025-06-26 DEVICE — ARISTA 3GR: Type: IMPLANTABLE DEVICE | Status: FUNCTIONAL

## 2025-06-26 RX ORDER — ENOXAPARIN SODIUM 100 MG/ML
40 INJECTION SUBCUTANEOUS EVERY 24 HOURS
Refills: 0 | Status: DISCONTINUED | OUTPATIENT
Start: 2025-06-27 | End: 2025-06-29

## 2025-06-26 RX ORDER — OXYCODONE HYDROCHLORIDE 30 MG/1
5 TABLET ORAL
Refills: 0 | Status: COMPLETED | OUTPATIENT
Start: 2025-06-26 | End: 2025-07-03

## 2025-06-26 RX ORDER — SODIUM CHLORIDE 9 G/1000ML
1000 INJECTION, SOLUTION INTRAVENOUS
Refills: 0 | Status: DISCONTINUED | OUTPATIENT
Start: 2025-06-26 | End: 2025-06-29

## 2025-06-26 RX ORDER — MODIFIED LANOLIN 100 %
1 CREAM (GRAM) TOPICAL EVERY 6 HOURS
Refills: 0 | Status: DISCONTINUED | OUTPATIENT
Start: 2025-06-26 | End: 2025-06-29

## 2025-06-26 RX ORDER — SODIUM CHLORIDE 9 G/1000ML
1000 INJECTION, SOLUTION INTRAVENOUS
Refills: 0 | Status: DISCONTINUED | OUTPATIENT
Start: 2025-06-26 | End: 2025-06-26

## 2025-06-26 RX ORDER — IBUPROFEN 200 MG
600 TABLET ORAL EVERY 6 HOURS
Refills: 0 | Status: COMPLETED | OUTPATIENT
Start: 2025-06-26 | End: 2026-05-25

## 2025-06-26 RX ORDER — CEFAZOLIN SODIUM IN 0.9 % NACL 3 G/100 ML
2000 INTRAVENOUS SOLUTION, PIGGYBACK (ML) INTRAVENOUS ONCE
Refills: 0 | Status: COMPLETED | OUTPATIENT
Start: 2025-06-26 | End: 2025-06-26

## 2025-06-26 RX ORDER — ACETAMINOPHEN 500 MG/5ML
975 LIQUID (ML) ORAL
Refills: 0 | Status: DISCONTINUED | OUTPATIENT
Start: 2025-06-26 | End: 2025-06-29

## 2025-06-26 RX ORDER — OXYTOCIN-SODIUM CHLORIDE 0.9% IV SOLN 30 UNIT/500ML 30-0.9/5 UT/ML-%
42 SOLUTION INTRAVENOUS
Qty: 30 | Refills: 0 | Status: DISCONTINUED | OUTPATIENT
Start: 2025-06-26 | End: 2025-06-29

## 2025-06-26 RX ORDER — OXYCODONE HYDROCHLORIDE 30 MG/1
5 TABLET ORAL ONCE
Refills: 0 | Status: DISCONTINUED | OUTPATIENT
Start: 2025-06-26 | End: 2025-06-29

## 2025-06-26 RX ORDER — KETOROLAC TROMETHAMINE 30 MG/ML
30 INJECTION, SOLUTION INTRAMUSCULAR; INTRAVENOUS EVERY 6 HOURS
Refills: 0 | Status: DISCONTINUED | OUTPATIENT
Start: 2025-06-26 | End: 2025-06-27

## 2025-06-26 RX ORDER — SIMETHICONE 80 MG
80 TABLET,CHEWABLE ORAL EVERY 4 HOURS
Refills: 0 | Status: DISCONTINUED | OUTPATIENT
Start: 2025-06-26 | End: 2025-06-29

## 2025-06-26 RX ORDER — CITRIC ACID/SODIUM CITRATE 300-500 MG
30 SOLUTION, ORAL ORAL ONCE
Refills: 0 | Status: COMPLETED | OUTPATIENT
Start: 2025-06-26 | End: 2025-06-26

## 2025-06-26 RX ORDER — DIPHENHYDRAMINE HCL 12.5MG/5ML
25 ELIXIR ORAL EVERY 6 HOURS
Refills: 0 | Status: DISCONTINUED | OUTPATIENT
Start: 2025-06-26 | End: 2025-06-29

## 2025-06-26 RX ORDER — MAGNESIUM HYDROXIDE 400 MG/5ML
30 SUSPENSION ORAL
Refills: 0 | Status: DISCONTINUED | OUTPATIENT
Start: 2025-06-26 | End: 2025-06-29

## 2025-06-26 RX ADMIN — Medication 100 MILLIGRAM(S): at 21:15

## 2025-06-26 RX ADMIN — Medication 20 MILLIGRAM(S): at 20:34

## 2025-06-26 RX ADMIN — Medication 30 MILLILITER(S): at 20:33

## 2025-06-26 RX ADMIN — KETOROLAC TROMETHAMINE 30 MILLIGRAM(S): 30 INJECTION, SOLUTION INTRAMUSCULAR; INTRAVENOUS at 23:32

## 2025-06-26 NOTE — OB RN PATIENT PROFILE - FALL HARM RISK - UNIVERSAL INTERVENTIONS
Bed in lowest position, wheels locked, appropriate side rails in place/Call bell, personal items and telephone in reach/Instruct patient to call for assistance before getting out of bed or chair/Non-slip footwear when patient is out of bed/[place] to call system/Physically safe environment - no spills, clutter or unnecessary equipment/Purposeful Proactive Rounding/Room/bathroom lighting operational, light cord in reach

## 2025-06-26 NOTE — OB PROVIDER H&P - NS_EFW_OBGYN_ALL_OB_FT
[place] NEPHROLOGY FOLLOW UP NOTE  --------------------------------------------------------------------------------  24 hour events/subjective: Patient examined. Appears comfortable.    PAST HISTORY  --------------------------------------------------------------------------------  No significant changes to PMH, PSH, FHx, SHx, unless otherwise noted    ALLERGIES & MEDICATIONS  --------------------------------------------------------------------------------  Allergies    No Known Allergies    Intolerances      Standing Inpatient Medications  atorvastatin 80 milliGRAM(s) Oral at bedtime  atropine Injectable 0.5 milliGRAM(s) IntraMuscular once  chlorhexidine 4% Liquid 1 Application(s) Topical <User Schedule>  cyanocobalamin 1000 MICROGram(s) Oral daily  ergocalciferol 71186 Unit(s) Oral <User Schedule>  folic acid 1 milliGRAM(s) Oral daily  furosemide    Tablet 20 milliGRAM(s) Oral two times a day  influenza   Vaccine 0.5 milliLiter(s) IntraMuscular once  insulin glargine Injectable (LANTUS) 21 Unit(s) SubCutaneous at bedtime  insulin lispro (ADMELOG) corrective regimen sliding scale   SubCutaneous three times a day before meals  insulin lispro Injectable (ADMELOG) 8 Unit(s) SubCutaneous three times a day before meals  magnesium gluconate 500 milliGRAM(s) Oral daily  melatonin 5 milliGRAM(s) Oral at bedtime  metoprolol succinate ER 50 milliGRAM(s) Oral daily  mupirocin 2% Nasal 1 Application(s) Nasal two times a day  pantoprazole    Tablet 40 milliGRAM(s) Oral before breakfast  polyethylene glycol 3350 17 Gram(s) Oral daily  potassium chloride   Powder 40 milliEquivalent(s) Oral once  senna 2 Tablet(s) Oral at bedtime    PRN Inpatient Medications  acetaminophen   Tablet .. 650 milliGRAM(s) Oral every 6 hours PRN  guaifenesin/dextromethorphan  Syrup 10 milliLiter(s) Oral every 6 hours PRN      VITALS/PHYSICAL EXAM  --------------------------------------------------------------------------------  T(C): 36.5 (04-15-21 @ 06:17), Max: 36.5 (04-15-21 @ 05:00)  HR: 92 (04-15-21 @ 06:17) (57 - 92)  BP: 112/62 (04-15-21 @ 06:17) (101/60 - 119/58)  RR: 18 (04-15-21 @ 06:17) (18 - 18)  SpO2: 94% (04-15-21 @ 07:40) (92% - 96%)  Wt(kg): --        04-14-21 @ 07:01  -  04-15-21 @ 07:00  --------------------------------------------------------  IN: 0 mL / OUT: 2225 mL / NET: -2225 mL      Physical Exam:  	Gen: NAD  	Pulm: CTA B/L  	CV: RRR, S1S2  	Abd: +BS, soft, nontender/nondistended  	: No suprapubic tenderness  	LE: Warm, edema    LABS/STUDIES  --------------------------------------------------------------------------------              8.9    4.54  >-----------<  113      [04-15-21 @ 09:43]              29.0     135  |  102  |  15  ----------------------------<  151      [04-15-21 @ 09:43]  3.6   |  20  |  1.0        Ca     8.4     [04-15-21 @ 09:43]    Creatinine Trend:  SCr 1.0 [04-15 @ 09:43]  SCr 0.9 [04-14 @ 06:09]  SCr 0.9 [04-13 @ 08:17]  SCr 0.9 [04-12 @ 06:19]  SCr 0.9 [04-11 @ 07:59]    Urinalysis - [03-30-21 @ 04:27]      Color Yellow / Appearance Slightly Turbid / SG 1.017 / pH 6.5      Gluc Negative / Ketone Negative  / Bili Negative / Urobili <2 mg/dL       Blood Large / Protein Trace / Leuk Est Large / Nitrite Negative       / WBC 84 / Hyaline 25 / Gran  / Sq Epi  / Non Sq Epi 0 / Bacteria Negative      Iron 178, TIBC 305, %sat 58      [03-30-21 @ 07:11]  Ferritin 339      [04-02-21 @ 09:04]  Vitamin D (25OH) 23      [04-03-21 @ 08:42]       0379

## 2025-06-26 NOTE — OB RN DELIVERY SUMMARY - NS_SEPSISRSKCALC_OBGYN_ALL_OB_FT
EOS calculated successfully. EOS Risk Factor: 0.5/1000 live births ([facility] national incidence); GA=40w;Temp=97; ROM=0; GBS='Negative'; Antibiotics='No antibiotics or any antibiotics < 2 hrs prior to birth'

## 2025-06-26 NOTE — OB RN INTRAOPERATIVE NOTE - NS_SKINCLOSURE _OBGYN_ALL_OB_DT
ASSESSMENT: Ana Maria Espinoza is a 33 y.o. female who presents for consultation at the request of [facility] PCP Vianey Zamudio NP, with a past medical history significant for ADHD, hyperlipidemia, anxiety, history of herpes simplex type II, history of  section, knee strain, lumbar strain, asthma, depression, pain medication agreement, tobacco use, tonsillar hypertrophy, vitamin D deficiency, restrained who presents today for new patient evaluation of hand pain, low back pain and knee pain:    -Overall patient's physical exam is reassuring that she has normal strength and reflexes in her upper and lower extremities.  And pain is possibly secondary to carpal tunnel syndrome.  She has many pending labs to check on autoimmune disorders.  This could possibly be reasoning for back and knee pain as well.    Patient is neurologically intact on exam. No myelopathic or red flag symptoms.     DOUGLAS Score: 34  NDI Score: 26    WHO 5: 10     Diagnoses and all orders for this visit:    Bilateral hand pain  -     EMG - Clinic; Future; Expected date: 2021    Chronic bilateral low back pain without sciatica    Chronic pain of both knees      PLAN:  Reviewed spine anatomy and disease process. Discussed diagnosis and treatment options with the patient today. A shared decision making model was used.  The patient's values and choices were respected. The following represents what was discussed and decided upon by the provider and the patient.      -DIAGNOSTIC TESTS:  Images were personally reviewed and interpreted and explained to patient today using spine model.   --X-ray of pelvis dated 2021 is personally viewed images interpreted discussed with patient.  There are normal joint spaces and alignment.  There are no fractures.  --X-rays of bilateral knees dated 2021 personally viewed images interpreted and discussed patient.  There is normal joints spaces and alignment with no fractures or joint effusion.  --X-rays of  bilateral hands dated 2/27/2021 is personally viewed images interpreted and discussed patient.  There are normal joint spaces and alignment with no erosive change.  There are no fractures.  --Borderline positive JAI  --EMG/nerve conduction study is ordered for bilateral upper extremities.    -PHYSICAL THERAPY: She has had several different sessions of physical therapy with no relief in the past.  Discussed the importance of core strengthening, ROM, stretching exercises with the patient and how each of these entities is important in decreasing pain.  Explained to the patient that the purpose of physical therapy is to teach the patient a home exercise program.  These exercises need to be performed every day in order to decrease pain and prevent future occurrences of pain.        -MEDICATIONS: She is currently taking ibuprofen and Tylenol.  Meloxicam has been prescribed.  I recommend that she fill this order.  -  Discussed multiple medication options today with patient. Discussed risks, side effects, and proper use of medications. Patient verbalized understanding.    -INTERVENTIONS: No interventions at this time.  Discussed risks and benefits of injections with patient today.    -PATIENT EDUCATION: .We discussed pain management in a multimodal fashion including physical therapy, medication management, possible future injections.    -FOLLOW-UP:   Patient will follow up with Dr. Griffith after EMG/nerve conduction study.    Advised patient to call the [facility] if symptoms worsen or you have problems controlling bladder and bowel function.   ______________________________________________________________________    SUBJECTIVE:  HPI:  Ana Maria Espinoza  Is a 33 y.o. female who presents today for new patient evaluation of low back pain.  The patient was seen by rheumatology on 2/25/2021.  X-rays of her knees pelvis and hands were obtained.  She is started on meloxicam 7.5 mg twice daily and referred to the spine center  for further evaluation.  Patient reports that she has had pain in many areas for many years which is progressively worsened over the last 1 to 2 years.  She is seen in rheumatology and is awaiting to have her blood work done.  Patient reports that her pain is worse with sitting and walking as well as the impact from going over bumps when she is in a car or on a 4 Bhat.  Moving around seems to help.  She is had physical therapy for many different areas of pain without relief.  The last time she had this was 1 or 2 years ago.  She notes that many years ago she was seen at the pain clinic where she is prescribed Percocet and other pain medications and was on high doses and found it did not help.  She weaned off of these and is no longer taking these.  She notes also that she has had cortisone injections into her spine with no relief.  The most painful area for her is her hands which are worse at night.  Her back, hips and knees are also painful and achy when she is walking with prolonged sitting as well.  Her pain today is 0/10 is worst is 8/10 is best a 0/10.  She denies any bowel or bladder changes, fevers, chills, unintentional weight loss.    -Treatment to Date: X-ray of bilateral hands, knees, pelvis dated 2/27/2021.    -Medications:    Current Outpatient Medications on File Prior to Encounter   Medication Sig Dispense Refill     albuterol (PROAIR HFA;PROVENTIL HFA;VENTOLIN HFA) 90 mcg/actuation inhaler Inhale 1-2 puffs.       EPINEPHrine (EPIPEN/ADRENACLICK/AUVI-Q) 0.3 mg/0.3 mL injection Inject 0.3 mg into the shoulder, thigh, or buttocks.       etonogestreL (NEXPLANON) 68 mg Impl implant Inject 1 each under the skin.       hydrOXYzine HCL (ATARAX) 25 MG tablet Take 12.5-25 mg by mouth.       ketoconazole (NIZORAL) 2 % cream Apply topically.       ketoconazole (NIZORAL) 2 % shampoo Use shampoo daily for flaky skin       meloxicam (MOBIC) 7.5 MG tablet Take 1 tablet p.o. twice daily or 2 tabs p.o. daily, prn.  Take with food. 60 tablet 2     nicotine (NICODERM CQ) 21 mg/24 hr Place 1 patch on the skin.       valACYclovir (VALTREX) 500 MG tablet        No current facility-administered medications on file prior to encounter.        Allergies   Allergen Reactions     Venom-Honey Bee Anaphylaxis     Has epipen  Has epipen       Aloe Hives     Dry skin as well      Codeine Other (See Comments) and Rash     Other reaction(s): Intolerance-Can't Take         No past medical history on file.     Patient Active Problem List   Diagnosis     ADHD (attention deficit hyperactivity disorder)     Contact dermatitis due to chemicals     Dyslipidemia     Generalized anxiety disorder     H/O herpes simplex type 2 infection     History of  section     Knee strain     Lumbar strain     Mild persistent asthma without complication     Moderate episode of recurrent major depressive disorder (H)     Nexplanon in place     Pain medication agreement     Reduced libido     Tobacco use     Tonsillar hypertrophy     Vitamin D deficiency     Wrist strain       Past surgical history: Tonsillectomy's, 2 C-sections, surgeries in both first toes.    Family history: Her father has diabetes.  Her parents and siblings have rheumatologic disease.    Reviewed past medical, surgical, and family history with patient found on new patient intake packet located in [employer]MR [employer] tab.     SOCIAL HX: She smokes cigarettes.  She denies drinking alcohol or using recreational drugs.    ROS: Specifically negative for bowel/bladder dysfunction, balance changes, headache, dizziness, foot drop, fevers, chills, appetite changes, nausea/vomiting, unexplained weight loss. Otherwise 13 systems reviewed are negative. Please see the patient's intake questionnaire from today for details.    OBJECTIVE:  /60 (Patient Site: Right Arm, Patient Position: Sitting, Cuff Size: Adult Large)   Pulse 86   Temp 98.1  F (36.7  C) (Oral)   Resp 17   Ht 5' (1.524 m)   Wt 163 lb 12.8  oz (74.3 kg)   SpO2 100%   BMI 31.99 kg/m      PHYSICAL EXAMINATION:    --CONSTITUTIONAL:  Vital signs as above.  No acute distress.  The patient is well nourished and well groomed.  --PSYCHIATRIC:  Appropriate mood and affect. The patient is awake, alert, oriented to person, place, time and answering questions appropriately with clear speech.    --SKIN:  Skin over the face, bilateral lower extremities, and posterior torso is clean, dry, intact without rashes.    --RESPIRATORY: Normal rhythm and effort. No abnormal accessory muscle breathing patterns noted.   --STANDING EXAMINATION:  Normal lumbar lordosis noted, no lateral shift.  --MUSCULOSKELETAL: Lumbar spine inspection reveals no evidence of deformity. Range of motion is mildly limited in lumbar flexion, extension, lateral rotation.  Tenderness palpation along the low lumbar paraspinal muscles bilaterally. Straight leg raising in the supine position is negative to radicular pain.   --SACROILIAC JOINT: Negative distraction.  Negative Luis Manuel's with reproduction of pain to affected extremity.  One Finger point test negative.  --GROSS MOTOR: Gait is non-antalgic. Easily arises from a seated position. Toe walking and heel walking are normal without significant difficulty.    --LOWER EXTREMITY MOTOR TESTING:  Plantar flexion left 5/5, right 5/5   Dorsiflexion left 5/5, right 5/5   Great toe MTP extension left 5/5, right 5/5  Knee flexion left 5/5, right 5/5  Knee extension left 5/5, right 5/5   Hip flexion left 5/5, right 5/5  Hip abduction left 5/5, right 5/5  Hip adduction left 5/5, right 5/5   --HIPS: Full range of motion bilaterally.  Stinchfield test is negative bilaterally.  --NEUROLOGICAL:  2/4 biceps, triceps, brachioradialis, patellar and achilles reflexes bilaterally.  Sensation to light touch is intact in bilateral upper and lower extremities. Babinski is negative. No clonus.  Negative Garfield reflex bilaterally.  --VASCULAR:  2/4 dorsalis pedis and  radial pulses bilaterally.  Bilateral lower extremities are warm.  There is no pitting edema of the bilateral lower extremities.  --HEENT:  Sclera are non-injected.    --SKIN:  Skin over the face, bilateral upper extremities, and posterior torso is clean, dry, intact without rashes.   --UPPER EXTREMITY MOTOR TESTING:  Wrist flexion left 5/5, right 5/5  Wrist extension left 5/5, right 5/5  Pronators left 5/5, right 5/5  Biceps left 5/5, right 5/5   Triceps left 5/5, right 5/5   Shoulder abduction left 5/5, right 5/5   left 5/5, right 5/5  --CERVICAL SPINE: Inspection reveals no evidence of deformity. Range of motion is mildly limited in cervical flexion, extension, or lateral rotation. No tenderness to palpation.  --SHOULDERS: Full range of motion bilaterally. Negative empty can.    RESULTS: Prior medical records from [facility] rheumatology were reviewed today.    Imaging: Hands, knees, pelvis imaging was personally reviewed and interpreted today. The images were shown to the patient and the findings were explained using a spine model.      Xr Pelvis W 2 Vw Hips Bilateral    Result Date: 2/27/2021  EXAM: XR PELVIS W 2 VW HIPS BILATERAL LOCATION: [facility] DATE/TIME: 2/27/2021 12:24 PM INDICATION: Pain in unspecified joint COMPARISON: None.     Normal joint spaces and alignment. No fracture.     Xr Hands Bilateral 3 Or More Vws    Result Date: 2/27/2021  EXAM: XR HANDS BILATERAL 3 OR MORE VWS LOCATION: [facility] DATE/TIME: 2/27/2021 12:22 PM INDICATION: Pain in unspecified joint COMPARISON: 4/10/2019 left wrist.     Normal joint spaces and alignment. No erosive change. No fracture.    Xr Knees Bilateral 1 Or 2 Vws    Result Date: 2/27/2021  EXAM: XR KNEES BILATERAL 1 OR 2 VWS LOCATION: [facility] DATE/TIME: 2/27/2021 12:22 PM INDICATION: Pain in unspecified joint COMPARISON: None.     RIGHT KNEE: Normal joint spaces and alignment.  No fracture or joint effusion. LEFT KNEE: Normal joint spaces and alignment. No fracture or joint effusion.              26-Jun-2025 22:15

## 2025-06-26 NOTE — OB RN PATIENT PROFILE - NS TRANSFER DISPOSITION PATIENT BELONGINGS
DNR Order/Comfort measures only/Do not re-hospitalize/No artificial nutrition/Antibiotic trial/IV fluid trial
with patient

## 2025-06-26 NOTE — OB RN DELIVERY SUMMARY - NSSELHIDDEN_OBGYN_ALL_OB_FT
[NS_DeliveryAttending1_OBGYN_ALL_OB_FT:[id number]=],[NS_DeliveryAssist1_OBGYN_ALL_OB_FT:[id number]=],[NS_CirculateRN2_OBGYN_ALL_OB_FT:[id number]=]

## 2025-06-26 NOTE — OB RN INTRAOPERATIVE NOTE - NSSELHIDDEN_OBGYN_ALL_OB_FT
[NS_DeliveryAttending1_OBGYN_ALL_OB_FT:[id number]=],[NS_DeliveryAssist1_OBGYN_ALL_OB_FT:[id number]=],[NS_CirculateRN2_OBGYN_ALL_OB_FT:[id number]=]

## 2025-06-26 NOTE — OB PROVIDER H&P - NSLOWPPHRISK_OBGYN_A_OB
No previous uterine incision/Flood Pregnancy/Less than or equal to 4 previous vaginal births/No known bleeding disorder/No history of postpartum hemorrhage

## 2025-06-26 NOTE — OB PROVIDER H&P - ASSESSMENT
40 yo  at 40w0d presenting for rCS  - Admit to L&D  - Consent signed for CS  - NPO  - IV fluids and labs ordered  - Ancef 2g ordered  - Continuous EFM     Attending Giacomo Romo PGY1

## 2025-06-26 NOTE — OB RN PATIENT PROFILE - DOES PATIENT HAVE ADVANCE DIRECTIVE
Pt states she lives alone in a coop apt. Pt states she is independent with all ADLs and ambulation. Pt states she uses a RW occasionally but usually does not use a AD. Pt drives and wears reading glasses. Pt states her family/friends are available to assist when needed.
[place]/Yes

## 2025-06-26 NOTE — PRE-ANESTHESIA EVALUATION ADULT - NSANTHPMHFT_GEN_ALL_CORE
7
OB Hx:  with IUP 40 weeks             G1- 2/2 NRFHRT remote from delivery, c/b Gest HTN             G2-now

## 2025-06-26 NOTE — OB PROVIDER H&P - HISTORY OF PRESENT ILLNESS
Patient is a 40yo  at 40w0d presenting for a repeat CS  - LOF - VB - CTX +FM    Ante: Spontaneous pregnancy. NIPT and anatomy scan wnl. Passed GCT. Normotensive.   EFW 3500 by Le[place]  GBS neg    OBHX:  G1 -  pCS for NRFHT remote from delivery after IOL. Intrapartum course c/b gHTN  GynHX: denies fibroids, ovarian cysts, abnormal pap smear, STI/herpes.   MedHX: denies  Surghx: denies  Medications: PNV  Allergies: NKDA    Physical Exam:    General: NAD  Pulm: no increased WOB  Abdomen: soft, gravid, nontender  Extremities: wnl     TAUS: Cephalic. Placenta anterior  VE: Deferred    EFM: [ ] bpm, mod variability, + accels, - decels; reactive and reassuring  [place]: no ctx

## 2025-06-26 NOTE — PRE-ANESTHESIA EVALUATION ADULT - NSANTHOSAYNRD_GEN_A_CORE
No. SEDA screening performed.  STOP BANG Legend: 0-2 = LOW Risk; 3-4 = INTERMEDIATE Risk; 5-8 = HIGH Risk

## 2025-06-26 NOTE — OB RN PATIENT PROFILE - NSSDOHHEADEN_OBGYN_A_OB
Patient: JOSE WHITE     Acct: [id number]     Report: #[id number]  UNIT #: [id number]     : 1970    Encounter Date:2019  PRIMARY CARE:   ***Signed***  --------------------------------------------------------------------------------------------------------------------  Progress Note      DATE: 19      Primary Care Provider:  KEVIN HEARN      Referring Provider:  KEVIN HEARN      Chief Complaint      FU Lymphocytosis            Subjective      49-year-old white male with a history of leukocytosis with lymphocytosis.      Patient follows up every 6 months.      Patient feels a nodule in the abdomen that burns.      Other than this patient gout attack but better with colchicine and allopurinol.            Past Med/Surg History            Past Med/Surg History:   Hypertension             Diabetes Mellitus             No Heart Disease             No Blood Clots             No Cancer             No Lung Disease             No Kidney Disease             Other (hyperlipidemia, gout)            Social History      Social History:  No Tobacco Use, No Alcohol Use (4 or 5 beers a night for 2 or 3    years/ 8 years ago), No Recreational Drug use, No Other            Allergies      Coded Allergies:             PENICILLINS (Unverified  Allergy, Unknown, UNK, 18)            Medications      Medications    Last Reconciled on 19 18:07 by RIOS PEREYRA MD      Lisinopril-HCTZ 20-25 Mg (Lisinopril-HCTZ 20-25 Mg) 1 Each Tablet      1 TAB PO QDAY, #30 TAB 0 Refills         Reported         19       Allopurinol (Allopurinol) 100 Mg Tablet      200 MG PO QDAY, #60 TAB 0 Refills         Reported         19       Colchicine (Colcrys) 0.6 Mg Tab      0.6 MG PO ASDIR DOSE INSTRXN, #7 TAB 0 Refills         Prov: Katia Pereyra         3/13/19       Glimepiride (Glimepiride*) 2 Mg Tablet      2 MG PO QDAY, TAB         Reported         3/13/19       Levothyroxine (Levothyroxine) 0.05 Mg Tablet       50 MCG PO QDAY@07, #30 TAB 0 Refills         Reported         3/13/19       Pravastatin Sodium (Pravastatin*) 80 Mg Tablet      80 MG PO QDAY, TAB         Reported         3/13/19       Atenolol (ATENOLOL) 25 Mg Tablet      25 MG PO HS, #30 TAB 0 Refills         Reported         3/13/19       Omeprazole (PriLOSEC*) 20 Mg Capsule.dr      20 MG PO QDAY, CAP         Reported         1/27/14      Current Medications      Current Medications Reviewed 9/16/19            Pain Assessment      Pain Intensity:  0      Description:  None            Review of Systems      General:  No Anxiety; Fatigue Scale: (0), Pain Scale: (0)      HEENT:  No Dysphagia      Respiratory:  No Cough, No Shortness of Air      Cardiovascular:  No Chest Pain      Gastrointestinal:  No Nausea, No Vomiting; Appetite Good (Good)      Genitourinary:  No Nocturia      Musculoskeletal:  No Joint Effusions, No Joint Tenderness      Endocrine:  No Heat Intolerance      Hematologic:  No Bleeding      Allergic/Immunologic:  No Hives      Psychological:  No Anxiety, No Depression      Neurological:  No Headaches      Skin:  No Rash            Vitals      Height 5 ft 9.5 in / 176.53 cm      Weight 292 lbs 0 oz / 132.165107 kg      BSA 2.44 m2      BMI 42.5 kg/m2      Temperature 97.9 F / 36.61 C      Pulse 81      Respirations 14      Blood Pressure 157/93      Pulse Oximetry 96%            Exam      Constitutional:  No acute distress, Conversant      Eyes:  Anicteric sclerae, Palpebral Conjunctivae (Pink), SUSAN      HENT:  Oropharynx clear; No Erythema; Buccal mucosae (Pink)      Neck:  Supple, Full Range of Motion      Cardiovascular:  RRR; No Murmurs; Normal PMI; No Peripheral Edema      Lungs:  Clear to Ausculation, Normal Respiratory Effort      Abdomen:  Soft, NABS; No Masses, No Tenderness      Chest:  Other (Symmetrical and equal)      Lymphatic:  Neck      Extremities:  No digital cyanosis, No digital ischemia, Normal gait, Other (No     deformity)       Neurological:  Cranial Nerve II-XII (Intact); No Focal Sensory deficits      Psychological:  Appropriate affect, Appropriate mood, Intact judgement, Alert,     Other (No dermatoses)      Skin:  Normal temperature, Normal tone, Normal texture, Normal turgor            Lab Results      Laboratory Tests      9/3/19 10:40            Laboratory Tests            Test       9/3/19      10:40 9/5/19      21:58             White Blood Count       15.25 10*3/uL      (4.80-10.80)                    Red Blood Count       5.09 10*6/uL      (4.70-6.10)                    Hemoglobin       14.7 g/dL      (14.0-18.0)                    Hematocrit       46.1 %      (42.0-52.0)                    Mean Corpuscular Volume       90.6 fL      (80.0-96.0)                    Mean Corpuscular Hemoglobin       28.9 pg      (27.0-31.0)                    Mean Corpuscular Hemoglobin      Concent 31.9      (33.0-37.0)                    Red Cell Distribution Width       13.4 %      (11.6-14.4)                    RDW Standard Deviation       44.4 fL      (35.1-43.9)                    Platelet Count       265 10*3/uL      (130-400)                    Mean Platelet Volume       10.9 fL      (9.4-12.4)                    Granulocytes (%)       39.3 %      (30.0-85.0)                    Granulocytes #       5.99 10*3/uL      (2.00-8.00)                    Lymphocytes # (Auto)       7.98 10*3/uL      (1.00-5.00)                    Monocytes # (Auto)       0.89 10*3/uL      (0.20-1.20)                    Eosinophils # (Auto)       0.24 10*3/uL      (0.00-0.70)                    Basophils # (Auto)       0.09 10*3/uL      (0.00-0.20)                    Immature Granulocytes %       0.4 %      (0.0-1.8)                    Lymphocytes %       52.3 %      (20.0-45.0)                    Monocytes %       5.8 %      (3.0-10.0)                    Eosinophils %       1.6 %      (0.0-7.0)                    Basophils %       0.6 %      (0.0-3.0)                     Nucleated Red Blood Cells %       0.00 %      (0.00-0.70)                    Immature Granulocytes #       0.06 10*3/uL      (0.00-0.20)                    Lab Scanned Report              LAB FINAL      CUMULATIVES -            Impression/Problem List      Leukocytosis with lymphocytosis,  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 is finished.            Plan            Return December with CBC, sed rate            Patient Education:        High Blood Pressure (Hypertension) (Alternative Therapy)            PREVENTION      Hx Influenza Vaccination:  No      Influenza Vaccine Declined:  Yes      2 or More Falls Past Year?:  No      Fall Past Year with Injury?:  No      Encouraged to follow-up with:  PCP regarding preventative exams.      Chart initiated by      GARTH Fleming MA                 Disclaimer: Converted document may not contain table formatting or lab diagrams. Please see [employer] [facility] for the authenticated document.   no

## 2025-06-27 LAB
BASOPHILS # BLD AUTO: 0.03 K/UL — SIGNIFICANT CHANGE UP (ref 0–0.2)
BASOPHILS NFR BLD AUTO: 0.2 % — SIGNIFICANT CHANGE UP (ref 0–2)
EOSINOPHIL # BLD AUTO: 0 K/UL — SIGNIFICANT CHANGE UP (ref 0–0.5)
EOSINOPHIL NFR BLD AUTO: 0 % — SIGNIFICANT CHANGE UP (ref 0–6)
HCT VFR BLD CALC: 31.3 % — LOW (ref 34.5–45)
HGB BLD-MCNC: 10 G/DL — LOW (ref 11.5–15.5)
IMM GRANULOCYTES # BLD AUTO: 0.18 K/UL — HIGH (ref 0–0.07)
IMM GRANULOCYTES NFR BLD AUTO: 1.1 % — HIGH (ref 0–0.9)
LYMPHOCYTES # BLD AUTO: 2.01 K/UL — SIGNIFICANT CHANGE UP (ref 1–3.3)
LYMPHOCYTES NFR BLD AUTO: 12.1 % — LOW (ref 13–44)
MCHC RBC-ENTMCNC: 28.7 PG — SIGNIFICANT CHANGE UP (ref 27–34)
MCHC RBC-ENTMCNC: 31.9 G/DL — LOW (ref 32–36)
MCV RBC AUTO: 89.7 FL — SIGNIFICANT CHANGE UP (ref 80–100)
MONOCYTES # BLD AUTO: 0.68 K/UL — SIGNIFICANT CHANGE UP (ref 0–0.9)
MONOCYTES NFR BLD AUTO: 4.1 % — SIGNIFICANT CHANGE UP (ref 2–14)
NEUTROPHILS # BLD AUTO: 13.7 K/UL — HIGH (ref 1.8–7.4)
NEUTROPHILS NFR BLD AUTO: 82.5 % — HIGH (ref 43–77)
NRBC # BLD AUTO: 0 K/UL — SIGNIFICANT CHANGE UP (ref 0–0)
NRBC # FLD: 0 K/UL — SIGNIFICANT CHANGE UP (ref 0–0)
NRBC BLD AUTO-RTO: 0 /100 WBCS — SIGNIFICANT CHANGE UP (ref 0–0)
PLATELET # BLD AUTO: 206 K/UL — SIGNIFICANT CHANGE UP (ref 150–400)
PMV BLD: 10.3 FL — SIGNIFICANT CHANGE UP (ref 7–13)
RBC # BLD: 3.49 M/UL — LOW (ref 3.8–5.2)
RBC # FLD: 14.3 % — SIGNIFICANT CHANGE UP (ref 10.3–14.5)
T PALLIDUM AB TITR SER: NEGATIVE — SIGNIFICANT CHANGE UP
WBC # BLD: 16.6 K/UL — HIGH (ref 3.8–10.5)
WBC # FLD AUTO: 16.6 K/UL — HIGH (ref 3.8–10.5)

## 2025-06-27 PROCEDURE — 86900 BLOOD TYPING SEROLOGIC ABO: CPT

## 2025-06-27 PROCEDURE — 86901 BLOOD TYPING SEROLOGIC RH(D): CPT

## 2025-06-27 PROCEDURE — C1889: CPT

## 2025-06-27 PROCEDURE — 59050 FETAL MONITOR W/REPORT: CPT

## 2025-06-27 PROCEDURE — 36415 COLL VENOUS BLD VENIPUNCTURE: CPT

## 2025-06-27 PROCEDURE — 86850 RBC ANTIBODY SCREEN: CPT

## 2025-06-27 PROCEDURE — 86780 TREPONEMA PALLIDUM: CPT

## 2025-06-27 PROCEDURE — 85025 COMPLETE CBC W/AUTO DIFF WBC: CPT

## 2025-06-27 RX ORDER — IBUPROFEN 200 MG
600 TABLET ORAL EVERY 6 HOURS
Refills: 0 | Status: DISCONTINUED | OUTPATIENT
Start: 2025-06-27 | End: 2025-06-29

## 2025-06-27 RX ORDER — ONDANSETRON HCL/PF 4 MG/2 ML
4 VIAL (ML) INJECTION ONCE
Refills: 0 | Status: DISCONTINUED | OUTPATIENT
Start: 2025-06-27 | End: 2025-06-29

## 2025-06-27 RX ADMIN — KETOROLAC TROMETHAMINE 30 MILLIGRAM(S): 30 INJECTION, SOLUTION INTRAMUSCULAR; INTRAVENOUS at 12:35

## 2025-06-27 RX ADMIN — OXYTOCIN-SODIUM CHLORIDE 0.9% IV SOLN 30 UNIT/500ML 42 MILLIUNIT(S)/MIN: 30-0.9/5 SOLUTION at 01:35

## 2025-06-27 RX ADMIN — ENOXAPARIN SODIUM 40 MILLIGRAM(S): 100 INJECTION SUBCUTANEOUS at 12:35

## 2025-06-27 RX ADMIN — Medication 975 MILLIGRAM(S): at 15:50

## 2025-06-27 RX ADMIN — Medication 80 MILLIGRAM(S): at 12:36

## 2025-06-27 RX ADMIN — Medication 975 MILLIGRAM(S): at 03:06

## 2025-06-27 RX ADMIN — KETOROLAC TROMETHAMINE 30 MILLIGRAM(S): 30 INJECTION, SOLUTION INTRAMUSCULAR; INTRAVENOUS at 19:02

## 2025-06-27 RX ADMIN — KETOROLAC TROMETHAMINE 30 MILLIGRAM(S): 30 INJECTION, SOLUTION INTRAMUSCULAR; INTRAVENOUS at 07:02

## 2025-06-27 RX ADMIN — Medication 975 MILLIGRAM(S): at 09:57

## 2025-06-27 RX ADMIN — Medication 975 MILLIGRAM(S): at 21:01

## 2025-06-27 RX ADMIN — SODIUM CHLORIDE 125 MILLILITER(S): 9 INJECTION, SOLUTION INTRAVENOUS at 05:23

## 2025-06-27 NOTE — OB PROVIDER DELIVERY SUMMARY - NSPROVIDERDELIVERYNOTE_OBGYN_ALL_OB_FT
40yo  at 40w0d presents for repeat CS  Abd prepped chlorhexidine  Pfannensteil skin incision  Low transverse uterine incision   delivered in cephalic presentation w/o difficulty  Uterus closed in one layer with 0 vicryl  Mark to hysterotomy  Fascia closed with 1 vicryl  Subcutaneous closed with 2-0 vicryl  Skin closed with 4-0 monocryl  Excellent hemostasis  , IVF 1L,    No complications   PLease see dictation

## 2025-06-27 NOTE — OB PROVIDER DELIVERY SUMMARY - NSSELHIDDEN_OBGYN_ALL_OB_FT
[NS_DeliveryAttending1_OBGYN_ALL_OB_FT:[id number]],[NS_DeliveryAssist1_OBGYN_ALL_OB_FT:[id number]],[NS_CirculateRN2_OBGYN_ALL_OB_FT:[id number]=]

## 2025-06-28 ENCOUNTER — TRANSCRIPTION ENCOUNTER (OUTPATIENT)
Age: 40
End: 2025-06-28

## 2025-06-28 PROCEDURE — 86850 RBC ANTIBODY SCREEN: CPT

## 2025-06-28 PROCEDURE — 86900 BLOOD TYPING SEROLOGIC ABO: CPT

## 2025-06-28 PROCEDURE — 36415 COLL VENOUS BLD VENIPUNCTURE: CPT

## 2025-06-28 PROCEDURE — 86780 TREPONEMA PALLIDUM: CPT

## 2025-06-28 PROCEDURE — 85025 COMPLETE CBC W/AUTO DIFF WBC: CPT

## 2025-06-28 PROCEDURE — 86901 BLOOD TYPING SEROLOGIC RH(D): CPT

## 2025-06-28 PROCEDURE — C1889: CPT

## 2025-06-28 PROCEDURE — 59050 FETAL MONITOR W/REPORT: CPT

## 2025-06-28 RX ORDER — OXYCODONE HYDROCHLORIDE 30 MG/1
5 TABLET ORAL
Refills: 0 | Status: DISCONTINUED | OUTPATIENT
Start: 2025-06-28 | End: 2025-06-29

## 2025-06-28 RX ADMIN — Medication 975 MILLIGRAM(S): at 08:25

## 2025-06-28 RX ADMIN — Medication 600 MILLIGRAM(S): at 11:55

## 2025-06-28 RX ADMIN — Medication 975 MILLIGRAM(S): at 21:04

## 2025-06-28 RX ADMIN — ENOXAPARIN SODIUM 40 MILLIGRAM(S): 100 INJECTION SUBCUTANEOUS at 12:43

## 2025-06-28 RX ADMIN — Medication 600 MILLIGRAM(S): at 17:55

## 2025-06-28 RX ADMIN — Medication 975 MILLIGRAM(S): at 04:12

## 2025-06-28 RX ADMIN — MAGNESIUM HYDROXIDE 30 MILLILITER(S): 400 SUSPENSION ORAL at 14:33

## 2025-06-28 RX ADMIN — Medication 600 MILLIGRAM(S): at 00:02

## 2025-06-28 RX ADMIN — Medication 975 MILLIGRAM(S): at 14:33

## 2025-06-28 RX ADMIN — Medication 600 MILLIGRAM(S): at 05:48

## 2025-06-28 RX ADMIN — Medication 600 MILLIGRAM(S): at 23:24

## 2025-06-28 NOTE — DISCHARGE NOTE OB - MEDICATION SUMMARY - MEDICATIONS TO TAKE
I will START or STAY ON the medications listed below when I get home from the hospital:    ibuprofen 600 mg oral tablet  -- 1 tab(s) by mouth every 6 hours, As Needed  -- Indication: For Postpartum state    Tylenol 325 mg oral tablet  -- 3 tab(s) by mouth every 6 hours, As Needed  -- Indication: For Postpartum state

## 2025-06-28 NOTE — DISCHARGE NOTE OB - CARE PROVIDER_API CALL
Magalys Gooden  Obstetrics & Gynecology  [address]  [place] [address]  Phone: [phone]  Fax: [phone]  Follow Up Time:

## 2025-06-28 NOTE — DISCHARGE NOTE OB - PATIENT PORTAL LINK FT
You can access the FollowMyHealth Patient Portal offered by [facility] by registering at the following website: [email or web address]. By joining [employer]’s FollowMyHealth portal, you will also be able to view your health information using other applications (apps) compatible with our system.

## 2025-06-28 NOTE — DISCHARGE NOTE OB - FINANCIAL ASSISTANCE
Problem: At Risk for Falls  Intervention: Risk for Fall Interventions (specify)  Selectively initiate interventions based on patient-specific fall risks. Document in  Associated [facility] on Daily Cares.     06/11/19 1934   Safety Measures   Environmental Safety Measures Maintained Done   Patient Specific Safety Measures in Use Safe lighting as appropriate   Risk for Falls Interventions   Patient's Personal Risk Factors Taking medications that cause drowsiness/problems walking;Potential for overestimating ability   Mobility and Gait Measures Mobilize (Early and progressive ambulation unless contraindicated);Use gait belt   OTHER   Elimination Risk Interventions Utilize visual cues (e.g. yield sign);Offer toileting with every interaction (patient able to identify need)         Problem: At Risk for Injury Due to Fall  Intervention: Risk for Fall-related Injury Interventions (specify)  Document in  Associated Rows or Daily Cares     06/11/19 1934   Risk for Fall-Related Injury Interventions   Patient-specific Special Conditions Recovering from surgery or other procedure;Taking medication that causes drowsiness/problems walking   Interventions to Prevent Special Condition Falling Inform/remind patient/family about special conditions/risks;Utilize visual cues (e.g. yield sign);Supervision during activities;Assistance during activities            [facility] provides services at a reduced cost to those who are determined to be eligible through [facility]’s financial assistance program. Information regarding [facility]’s financial assistance program can be found by going to [email or web address] or by calling [phone].

## 2025-06-28 NOTE — DISCHARGE NOTE OB - HOSPITAL COURSE
Pt is a 39yF s/p c-sx.  Please see delivery note for details.  During postpartum course patient's vitals were stable, vaginal bleeding appropriate, and pain well controlled.  On day of discharge patient was ambulating, pt had adequate oral intake, and was voiding freely.  Discharge instructions and precautions were given.  Will return to clinic in 1-2 weeks for incision check.

## 2025-06-29 VITALS
OXYGEN SATURATION: 98 % | TEMPERATURE: 98 F | HEART RATE: 85 BPM | DIASTOLIC BLOOD PRESSURE: 85 MMHG | RESPIRATION RATE: 18 BRPM | SYSTOLIC BLOOD PRESSURE: 128 MMHG

## 2025-06-29 PROBLEM — O09.523 SUPERVISION OF ELDERLY MULTIGRAVIDA IN THIRD TRIMESTER (>=35 YEARS OLD AT TIME OF DELIVERY): Status: ACTIVE | Noted: 2025-05-28

## 2025-06-29 PROBLEM — O34.219 UTERINE SCAR FROM PREVIOUS CESAREAN DELIVERY, ANTEPARTUM: Status: ACTIVE | Noted: 2025-06-15

## 2025-06-29 PROCEDURE — 85025 COMPLETE CBC W/AUTO DIFF WBC: CPT

## 2025-06-29 PROCEDURE — 86901 BLOOD TYPING SEROLOGIC RH(D): CPT

## 2025-06-29 PROCEDURE — 86780 TREPONEMA PALLIDUM: CPT

## 2025-06-29 PROCEDURE — 86850 RBC ANTIBODY SCREEN: CPT

## 2025-06-29 PROCEDURE — 36415 COLL VENOUS BLD VENIPUNCTURE: CPT

## 2025-06-29 PROCEDURE — 59050 FETAL MONITOR W/REPORT: CPT

## 2025-06-29 PROCEDURE — 86900 BLOOD TYPING SEROLOGIC ABO: CPT

## 2025-06-29 PROCEDURE — C1889: CPT

## 2025-06-29 RX ADMIN — Medication 600 MILLIGRAM(S): at 12:37

## 2025-06-29 RX ADMIN — Medication 975 MILLIGRAM(S): at 03:17

## 2025-06-29 RX ADMIN — Medication 600 MILLIGRAM(S): at 13:44

## 2025-06-29 RX ADMIN — Medication 600 MILLIGRAM(S): at 05:58

## 2025-06-29 RX ADMIN — Medication 975 MILLIGRAM(S): at 09:41

## 2025-06-29 NOTE — PROGRESS NOTE ADULT - ASSESSMENT
39y Female POD#2 s/p repeat  section , uncomplicated                                     - Neuro/Pain:  toradol atc, tylenol atc, oxy prn  - CV:  VS per routine, POD1 Hgb 10.0, asymptomatic  - Pulm: Encourage ISS & ambulation  - GI: regular diet  - : voiding  - DVT ppx: SCDs, Lovenox 40mg QD  - Dispo: POD #2/3
A/P: 39y s/p  section, POD#3, stable  -  Pain: PO motrin q6hrs, tylenol q8hrs, oxycodone for severe pain PRN  -  Post-operatively labs: post-op Hgb stable, hemodynamically stable, no symptoms of anemia   -  GI: tolerating regular diet, passing gas  -  : s/p nguyne , urinating without difficulty  -  DVT prophylaxis: encouraged increased ambulation, SCDs, SQL  -  Dispo: POD 3 or 4
39y Female POD#1 s/p repeat  section , uncomplicated                                     - Neuro/Pain:  toradol atc, tylenol atc, oxy prn  - CV:  VS per routine, AM CBC pending  - Pulm: Encourage ISS & ambulation  - GI: regular diet  - : Hudson in place, to be removed this morning, TOV this afternoon  - DVT ppx: SCDs, Lovenox 40mg QD  - Dispo: POD #2/3

## 2025-06-29 NOTE — PROGRESS NOTE ADULT - SUBJECTIVE AND OBJECTIVE BOX
Patient evaluated at bedside this morning, resting comfortable in bed, no acute events overnight. She reports pain is well-controlled.  She denies headache, dizziness, changes in vision, chest pain, palpitations, shortness of breath, RUQ pain, nausea, vomiting, fever, chills, heavy vaginal bleeding.  She has been ambulating without assistance, voiding spontaneously, tolerating food, passing flatus and having BMs      Physical Exam:  T(C): 36.5 (06-28-25 @ 06:00), Max: 36.6 (06-27-25 @ 21:59)  HR: 73 (06-28-25 @ 06:00) (73 - 82)  BP: 113/76 (06-28-25 @ 06:00) (113/76 - 114/76)  RR: 18 (06-28-25 @ 06:00) (17 - 18)  SpO2: 98% (06-28-25 @ 06:00) (98% - 98%)    Gen: no apparent distress  Pulm: no increased work of breathing  Abd: soft, nontender, nondistended, no rebound or guarding, uterus firm, incision clean dry intact  Extremities: trace pedal edema bilaterally, no calf tenderness bilaterally                          10.0   16.60 )-----------( 206      ( 27 Jun 2025 05:30 )             31.3           acetaminophen     Tablet .. 975 milliGRAM(s) Oral <User Schedule>  diphenhydrAMINE 25 milliGRAM(s) Oral every 6 hours PRN  diphtheria/tetanus/pertussis (acellular) Vaccine (Adacel) 0.5 milliLiter(s) IntraMuscular once  enoxaparin Injectable 40 milliGRAM(s) SubCutaneous every 24 hours  ibuprofen  Tablet. 600 milliGRAM(s) Oral every 6 hours  lactated ringers. 1000 milliLiter(s) IV Continuous <Continuous>  lanolin Ointment 1 Application(s) Topical every 6 hours PRN  magnesium hydroxide Suspension 30 milliLiter(s) Oral two times a day PRN  ondansetron Injectable 4 milliGRAM(s) IV Push once  oxyCODONE    IR 5 milliGRAM(s) Oral once PRN  oxyCODONE    IR 5 milliGRAM(s) Oral every 3 hours PRN  oxytocin Infusion 42 milliUNIT(s)/Min IV Continuous <Continuous>  simethicone 80 milliGRAM(s) Chew every 4 hours PRN  
Patient evaluated at bedside this morning, resting comfortably in bed, with no acute events overnight. Some pain at incision. She had water overnight but nothing to eat, hungry for breakfast   She reports pain is well controlled with oral pain medications.   She has not tried ambulating since procedure, nguyen remains in place at this time.    Physical Exam:  Vital Signs Last 24 Hrs  T(C): 37.1 (27 Jun 2025 06:00), Max: 37.1 (27 Jun 2025 06:00)  T(F): 98.7 (27 Jun 2025 06:00), Max: 98.7 (27 Jun 2025 06:00)  HR: 65 (27 Jun 2025 06:00) (65 - 92)  BP: 117/80 (27 Jun 2025 06:00) (110/74 - 141/80)  BP(mean): 89 (27 Jun 2025 00:30) (89 - 102)  RR: 18 (27 Jun 2025 06:00) (17 - 20)  SpO2: 99% (27 Jun 2025 06:00) (95% - 99%)    Parameters below as of 27 Jun 2025 00:30  Patient On (Oxygen Delivery Method): room air        GA: well-appearing, NAD  Pulm: no increased WOB  Abd: soft, nontender, no rebound or guarding, incision clean, dry and intact, uterus firm at midline,  fb below umbilicus  : nguyen in situ, lochia WNL  Extremities: no calf tenderness, SCDs in place                            11.3   11.33 )-----------( 208      ( 26 Jun 2025 16:00 )             35.5               acetaminophen     Tablet .. 975 milliGRAM(s) Oral <User Schedule>  diphenhydrAMINE 25 milliGRAM(s) Oral every 6 hours PRN  diphtheria/tetanus/pertussis (acellular) Vaccine (Adacel) 0.5 milliLiter(s) IntraMuscular once  enoxaparin Injectable 40 milliGRAM(s) SubCutaneous every 24 hours  ibuprofen  Tablet. 600 milliGRAM(s) Oral every 6 hours  ketorolac   Injectable 30 milliGRAM(s) IV Push every 6 hours  lactated ringers. 1000 milliLiter(s) IV Continuous <Continuous>  lanolin Ointment 1 Application(s) Topical every 6 hours PRN  magnesium hydroxide Suspension 30 milliLiter(s) Oral two times a day PRN  ondansetron Injectable 4 milliGRAM(s) IV Push once  oxyCODONE    IR 5 milliGRAM(s) Oral every 3 hours PRN  oxyCODONE    IR 5 milliGRAM(s) Oral once PRN  oxytocin Infusion 42 milliUNIT(s)/Min IV Continuous <Continuous>  simethicone 80 milliGRAM(s) Chew every 4 hours PRN    
Patient evaluated at bedside this morning, resting comfortable in bed.   She reports pain is well controlled with oral pain medications.   She denies headache, dizziness, chest pain, palpitations, shortness of breath, nausea, vomiting or heavy vaginal bleeding.  She has been ambulating without assistance, voiding spontaneously, passing gas, tolerating regular diet.     Physical Exam:  Vital Signs Last 24 Hrs  T(C): 36.8 (28 Jun 2025 22:00), Max: 36.8 (28 Jun 2025 22:00)  T(F): 98.2 (28 Jun 2025 22:00), Max: 98.2 (28 Jun 2025 22:00)  HR: 82 (28 Jun 2025 22:00) (67 - 83)  BP: 116/72 (28 Jun 2025 22:00) (113/76 - 126/81)  BP(mean): --  RR: 16 (28 Jun 2025 22:00) (16 - 18)  SpO2: 97% (28 Jun 2025 22:00) (97% - 98%)    Parameters below as of 28 Jun 2025 22:00  Patient On (Oxygen Delivery Method): room air        GA: NAD, A+0 x 3  Pulm: no increased WOB  Abd: soft, nontender, nondistended, no rebound or guarding, incision clean, dry and intact, uterus firm at midline, 2 fb below umbilicus  Extremities: no swelling or calf tenderness                             10.0   16.60 )-----------( 206      ( 27 Jun 2025 05:30 )             31.3

## 2025-06-29 NOTE — PROGRESS NOTE ADULT - ATTENDING COMMENTS
Pt seen and evaluated by me this AM. In brief, pt is a 38yo now P2 POD3 sp uncomplicated RCS. Pt reports feeling well today, is ambulating, voiding, tolerating PO, passing flatus. Denies HA, CP, SOB, N/V.     VSS.     Exam:   Gen: NAD  CV: Clinically well perfused   Pulm: No increased work of breathing   Abd: Soft, appropriately tender, fundus firm, incision c/d/i  : Lochia mild   Ext: Symmetric, no swelling.     Plan for discharge home today with follow up with Dr. Gooden in 2 wks. Discussed return precautions including heavy bleeding, abdominal pain, inability to tolerate PO, inability to void or pass gas/BM, fevers/chills, chest pain, SOB, N/V, unilateral LE swelling. Discussed pelvic rest and no heavy lifting. Pt verbalized understanding and is in agreement with plan. All questions answered.
Pt seen and evaluated by me this AM. In brief, pt is a 40yo now P2 POD2 sp uncomplicated RCS. Pt reports feeling well today, is ambulating, voiding, tolerating PO, passing flatus. Denies HA, CP, SOB, N/V.     VSS.     Hgb 11.3 preop > 10.0 POD1    Exam:   Gen: NAD  CV: Clinically well perfused   Pulm: No increased work of breathing   Abd: Soft, appropriately tender, fundus firm, incision c/d/i  : Lochia mild   Ext: Symmetric, no swelling.     Plan for routine postoperative care.

## 2025-07-03 DIAGNOSIS — O34.211 MATERNAL CARE FOR LOW TRANSVERSE SCAR FROM PREVIOUS CESAREAN DELIVERY: ICD-10-CM

## 2025-07-03 DIAGNOSIS — Z3A.40 40 WEEKS GESTATION OF PREGNANCY: ICD-10-CM

## 2025-07-03 DIAGNOSIS — Z28.09 IMMUNIZATION NOT CARRIED OUT BECAUSE OF OTHER CONTRAINDICATION: ICD-10-CM

## 2025-07-11 ENCOUNTER — EMERGENCY (EMERGENCY)
Facility: HOSPITAL | Age: 40
LOS: 1 days | End: 2025-07-11
Admitting: EMERGENCY MEDICINE
Payer: COMMERCIAL

## 2025-07-11 VITALS
DIASTOLIC BLOOD PRESSURE: 79 MMHG | SYSTOLIC BLOOD PRESSURE: 120 MMHG | OXYGEN SATURATION: 100 % | TEMPERATURE: 98 F | RESPIRATION RATE: 16 BRPM | HEART RATE: 76 BPM

## 2025-07-11 VITALS
DIASTOLIC BLOOD PRESSURE: 91 MMHG | OXYGEN SATURATION: 100 % | RESPIRATION RATE: 16 BRPM | SYSTOLIC BLOOD PRESSURE: 150 MMHG | WEIGHT: 139.99 LBS | HEIGHT: 62 IN | HEART RATE: 94 BPM | TEMPERATURE: 98 F

## 2025-07-11 LAB
ALBUMIN SERPL ELPH-MCNC: 4.1 G/DL — SIGNIFICANT CHANGE UP (ref 3.3–5)
ALP SERPL-CCNC: 143 U/L — HIGH (ref 40–120)
ALT FLD-CCNC: 14 U/L — SIGNIFICANT CHANGE UP (ref 4–33)
ANION GAP SERPL CALC-SCNC: 17 MMOL/L — HIGH (ref 7–14)
APPEARANCE UR: ABNORMAL
AST SERPL-CCNC: 11 U/L — SIGNIFICANT CHANGE UP (ref 4–32)
BACTERIA # UR AUTO: NEGATIVE /HPF — SIGNIFICANT CHANGE UP
BASOPHILS # BLD AUTO: 0.06 K/UL — SIGNIFICANT CHANGE UP (ref 0–0.2)
BASOPHILS NFR BLD AUTO: 0.6 % — SIGNIFICANT CHANGE UP (ref 0–2)
BILIRUB SERPL-MCNC: 0.4 MG/DL — SIGNIFICANT CHANGE UP (ref 0.2–1.2)
BILIRUB UR-MCNC: NEGATIVE — SIGNIFICANT CHANGE UP
BLOOD GAS VENOUS COMPREHENSIVE RESULT: SIGNIFICANT CHANGE UP
BUN SERPL-MCNC: 15 MG/DL — SIGNIFICANT CHANGE UP (ref 7–23)
CALCIUM SERPL-MCNC: 9.4 MG/DL — SIGNIFICANT CHANGE UP (ref 8.4–10.5)
CAST: 0 /LPF — SIGNIFICANT CHANGE UP (ref 0–4)
CHLORIDE SERPL-SCNC: 100 MMOL/L — SIGNIFICANT CHANGE UP (ref 98–107)
CO2 SERPL-SCNC: 22 MMOL/L — SIGNIFICANT CHANGE UP (ref 22–31)
COLOR SPEC: YELLOW — SIGNIFICANT CHANGE UP
CREAT SERPL-MCNC: 0.73 MG/DL — SIGNIFICANT CHANGE UP (ref 0.5–1.3)
DIFF PNL FLD: ABNORMAL
EGFR: 109 ML/MIN/1.73M2 — SIGNIFICANT CHANGE UP
EGFR: 109 ML/MIN/1.73M2 — SIGNIFICANT CHANGE UP
EOSINOPHIL # BLD AUTO: 0.14 K/UL — SIGNIFICANT CHANGE UP (ref 0–0.5)
EOSINOPHIL NFR BLD AUTO: 1.3 % — SIGNIFICANT CHANGE UP (ref 0–6)
FLUAV AG NPH QL: SIGNIFICANT CHANGE UP
FLUBV AG NPH QL: SIGNIFICANT CHANGE UP
GLUCOSE SERPL-MCNC: 96 MG/DL — SIGNIFICANT CHANGE UP (ref 70–99)
GLUCOSE UR QL: NEGATIVE MG/DL — SIGNIFICANT CHANGE UP
HCT VFR BLD CALC: 36.6 % — SIGNIFICANT CHANGE UP (ref 34.5–45)
HGB BLD-MCNC: 11.3 G/DL — LOW (ref 11.5–15.5)
IMM GRANULOCYTES # BLD AUTO: 0.11 K/UL — HIGH (ref 0–0.07)
IMM GRANULOCYTES NFR BLD AUTO: 1 % — HIGH (ref 0–0.9)
KETONES UR QL: NEGATIVE MG/DL — SIGNIFICANT CHANGE UP
LEUKOCYTE ESTERASE UR-ACNC: ABNORMAL
LYMPHOCYTES # BLD AUTO: 2.95 K/UL — SIGNIFICANT CHANGE UP (ref 1–3.3)
LYMPHOCYTES NFR BLD AUTO: 27.1 % — SIGNIFICANT CHANGE UP (ref 13–44)
MCHC RBC-ENTMCNC: 27.2 PG — SIGNIFICANT CHANGE UP (ref 27–34)
MCHC RBC-ENTMCNC: 30.9 G/DL — LOW (ref 32–36)
MCV RBC AUTO: 88.2 FL — SIGNIFICANT CHANGE UP (ref 80–100)
MONOCYTES # BLD AUTO: 1.29 K/UL — HIGH (ref 0–0.9)
MONOCYTES NFR BLD AUTO: 11.8 % — SIGNIFICANT CHANGE UP (ref 2–14)
NEUTROPHILS # BLD AUTO: 6.34 K/UL — SIGNIFICANT CHANGE UP (ref 1.8–7.4)
NEUTROPHILS NFR BLD AUTO: 58.2 % — SIGNIFICANT CHANGE UP (ref 43–77)
NITRITE UR-MCNC: NEGATIVE — SIGNIFICANT CHANGE UP
NRBC # BLD AUTO: 0 K/UL — SIGNIFICANT CHANGE UP (ref 0–0)
NRBC # FLD: 0 K/UL — SIGNIFICANT CHANGE UP (ref 0–0)
NRBC BLD AUTO-RTO: 0 /100 WBCS — SIGNIFICANT CHANGE UP (ref 0–0)
PH UR: 6.5 — SIGNIFICANT CHANGE UP (ref 5–8)
PLATELET # BLD AUTO: 418 K/UL — HIGH (ref 150–400)
PMV BLD: 9.8 FL — SIGNIFICANT CHANGE UP (ref 7–13)
POTASSIUM SERPL-MCNC: 3.6 MMOL/L — SIGNIFICANT CHANGE UP (ref 3.5–5.3)
POTASSIUM SERPL-SCNC: 3.6 MMOL/L — SIGNIFICANT CHANGE UP (ref 3.5–5.3)
PROT SERPL-MCNC: 7.7 G/DL — SIGNIFICANT CHANGE UP (ref 6–8.3)
PROT UR-MCNC: NEGATIVE MG/DL — SIGNIFICANT CHANGE UP
RBC # BLD: 4.15 M/UL — SIGNIFICANT CHANGE UP (ref 3.8–5.2)
RBC # FLD: 14.1 % — SIGNIFICANT CHANGE UP (ref 10.3–14.5)
RBC CASTS # UR COMP ASSIST: 1 /HPF — SIGNIFICANT CHANGE UP (ref 0–4)
REVIEW: SIGNIFICANT CHANGE UP
RSV RNA NPH QL NAA+NON-PROBE: SIGNIFICANT CHANGE UP
SARS-COV-2 RNA SPEC QL NAA+PROBE: SIGNIFICANT CHANGE UP
SODIUM SERPL-SCNC: 139 MMOL/L — SIGNIFICANT CHANGE UP (ref 135–145)
SOURCE RESPIRATORY: SIGNIFICANT CHANGE UP
SP GR SPEC: 1.01 — SIGNIFICANT CHANGE UP (ref 1–1.03)
SQUAMOUS # UR AUTO: 3 /HPF — SIGNIFICANT CHANGE UP (ref 0–5)
UROBILINOGEN FLD QL: 0.2 MG/DL — SIGNIFICANT CHANGE UP (ref 0.2–1)
WBC # BLD: 10.89 K/UL — HIGH (ref 3.8–10.5)
WBC # FLD AUTO: 10.89 K/UL — HIGH (ref 3.8–10.5)
WBC UR QL: 82 /HPF — HIGH (ref 0–5)

## 2025-07-11 PROCEDURE — 71046 X-RAY EXAM CHEST 2 VIEWS: CPT | Mod: 26

## 2025-07-11 PROCEDURE — 99284 EMERGENCY DEPT VISIT MOD MDM: CPT

## 2025-07-11 RX ORDER — CEPHALEXIN 250 MG/1
500 CAPSULE ORAL ONCE
Refills: 0 | Status: COMPLETED | OUTPATIENT
Start: 2025-07-11 | End: 2025-07-11

## 2025-07-11 RX ORDER — CEPHALEXIN 250 MG/1
1 CAPSULE ORAL
Qty: 15 | Refills: 0
Start: 2025-07-11

## 2025-07-11 RX ADMIN — Medication 1000 MILLILITER(S): at 21:53

## 2025-07-11 RX ADMIN — CEPHALEXIN 500 MILLIGRAM(S): 250 CAPSULE ORAL at 23:20

## 2025-07-11 NOTE — ED ADULT NURSE NOTE - NSFALLUNIVINTERV_ED_ALL_ED
Bed/Stretcher in lowest position, wheels locked, appropriate side rails in place/Call bell, personal items and telephone in reach/Instruct patient to call for assistance before getting out of bed/chair/stretcher/Non-slip footwear applied when patient is off stretcher/[place] to call system/Physically safe environment - no spills, clutter or unnecessary equipment/Purposeful proactive rounding/Room/bathroom lighting operational, light cord in reach

## 2025-07-11 NOTE — ED ADULT TRIAGE NOTE - CHIEF COMPLAINT QUOTE
pt complains of fever starting 2 days ago s/p  on  pt OB is MD Gooden  . pt denies changes in vision, chest pain, headache, nausea, dizziness, vomiting,   and  SOB. pt denies  past medical hx  . L&D NP Destiny pt to be seen in ER

## 2025-07-11 NOTE — ED PROVIDER NOTE - GASTROINTESTINAL, MLM
post surgical wound, clean dry and intact. no acute signs of infection. Abdomen soft, non-tender, no guarding.

## 2025-07-11 NOTE — ED PROVIDER NOTE - OBJECTIVE STATEMENT
patient is a 39-year-old female, 12 days status post  presenting to the ED with a complaint of low-grade fever for 2 days.  There is no associated URI symptoms, abdominal pain, nausea, vomiting, diarrhea, urinary symptoms.  Patient is not currently breast-feeding, is free of breast pain or tenderness.  Postsurgical wound is healed without bleeding, purulent discharge or tenderness.  She additionally denies recent travel, known sick contacts prior to the onset of symptoms.

## 2025-07-11 NOTE — ED PROVIDER NOTE - PATIENT PORTAL LINK FT
You can access the FollowMyHealth Patient Portal offered by [facility] by registering at the following website: [email or web address]. By joining [employer]’s FollowMyHealth portal, you will also be able to view your health information using other applications (apps) compatible with our system.

## 2025-07-11 NOTE — ED PROVIDER NOTE - CLINICAL SUMMARY MEDICAL DECISION MAKING FREE TEXT BOX
patient is a 39-year-old female, 12 days status post  presenting to the ED with a complaint of low-grade fever for 2 days. on presentation patient well-appearing, vital stable, currently afebrile physical exam is unremarkable, no mastitis, postsurgical infection or wound dehiscence.  Plan for infectious workup including routine labs UA viral swab, chest x-ray.  Will treat with IV fluids.

## 2025-07-11 NOTE — ED PROVIDER NOTE - PROGRESS NOTE DETAILS
on reassessment were patient reports feeling well, vital stable has remained afebrile.  Labs reviewed UA positive for WBCs and large leukocyte esterase.  Patient will be treated with antibiotics advised to follow-up with urine culture if negative may discontinue antibiotics.  Return precautions discussed.

## 2025-07-11 NOTE — ED ADULT NURSE NOTE - OBJECTIVE STATEMENT
39y female with no past medical history c/o  fever and chills starting 2 days ago, Pt is  s/p  on . Pt denies any pain, SOB, dysuria, nausea, vomiting, diarrhea, 20g IV placed right AC, labs obtained and sent to the lab , call bell within reach, will continue to monitor

## 2025-07-12 ENCOUNTER — EMERGENCY (EMERGENCY)
Facility: HOSPITAL | Age: 40
LOS: 1 days | End: 2025-07-12
Attending: EMERGENCY MEDICINE | Admitting: STUDENT IN AN ORGANIZED HEALTH CARE EDUCATION/TRAINING PROGRAM
Payer: COMMERCIAL

## 2025-07-12 VITALS
RESPIRATION RATE: 18 BRPM | TEMPERATURE: 98 F | OXYGEN SATURATION: 97 % | SYSTOLIC BLOOD PRESSURE: 123 MMHG | DIASTOLIC BLOOD PRESSURE: 83 MMHG | HEART RATE: 89 BPM

## 2025-07-12 VITALS
TEMPERATURE: 98 F | HEART RATE: 84 BPM | OXYGEN SATURATION: 100 % | SYSTOLIC BLOOD PRESSURE: 134 MMHG | DIASTOLIC BLOOD PRESSURE: 87 MMHG | HEIGHT: 62 IN | RESPIRATION RATE: 20 BRPM | WEIGHT: 139.99 LBS

## 2025-07-12 DIAGNOSIS — Z98.891 HISTORY OF UTERINE SCAR FROM PREVIOUS SURGERY: Chronic | ICD-10-CM

## 2025-07-12 PROBLEM — Z78.9 OTHER SPECIFIED HEALTH STATUS: Chronic | Status: ACTIVE | Noted: 2025-06-26

## 2025-07-12 LAB
ANION GAP SERPL CALC-SCNC: 15 MMOL/L — SIGNIFICANT CHANGE UP (ref 5–17)
APPEARANCE UR: CLEAR — SIGNIFICANT CHANGE UP
BASOPHILS # BLD AUTO: 0.04 K/UL — SIGNIFICANT CHANGE UP (ref 0–0.2)
BASOPHILS NFR BLD AUTO: 0.4 % — SIGNIFICANT CHANGE UP (ref 0–2)
BILIRUB UR-MCNC: NEGATIVE — SIGNIFICANT CHANGE UP
BUN SERPL-MCNC: 15 MG/DL — SIGNIFICANT CHANGE UP (ref 7–23)
CALCIUM SERPL-MCNC: 9.5 MG/DL — SIGNIFICANT CHANGE UP (ref 8.4–10.5)
CHLORIDE SERPL-SCNC: 103 MMOL/L — SIGNIFICANT CHANGE UP (ref 96–108)
CO2 SERPL-SCNC: 22 MMOL/L — SIGNIFICANT CHANGE UP (ref 22–31)
COLOR SPEC: YELLOW — SIGNIFICANT CHANGE UP
CREAT SERPL-MCNC: 0.74 MG/DL — SIGNIFICANT CHANGE UP (ref 0.5–1.3)
DIFF PNL FLD: NEGATIVE — SIGNIFICANT CHANGE UP
EGFR: 107 ML/MIN/1.73M2 — SIGNIFICANT CHANGE UP
EGFR: 107 ML/MIN/1.73M2 — SIGNIFICANT CHANGE UP
EOSINOPHIL # BLD AUTO: 0.12 K/UL — SIGNIFICANT CHANGE UP (ref 0–0.5)
EOSINOPHIL NFR BLD AUTO: 1.1 % — SIGNIFICANT CHANGE UP (ref 0–6)
GLUCOSE SERPL-MCNC: 101 MG/DL — HIGH (ref 70–99)
GLUCOSE UR QL: NEGATIVE MG/DL — SIGNIFICANT CHANGE UP
HCT VFR BLD CALC: 33 % — LOW (ref 34.5–45)
HGB BLD-MCNC: 10.3 G/DL — LOW (ref 11.5–15.5)
IMM GRANULOCYTES # BLD AUTO: 0.1 K/UL — HIGH (ref 0–0.07)
IMM GRANULOCYTES NFR BLD AUTO: 0.9 % — SIGNIFICANT CHANGE UP (ref 0–0.9)
KETONES UR QL: NEGATIVE MG/DL — SIGNIFICANT CHANGE UP
LACTATE SERPL-SCNC: 1.3 MMOL/L — SIGNIFICANT CHANGE UP (ref 0.5–2)
LEUKOCYTE ESTERASE UR-ACNC: ABNORMAL
LYMPHOCYTES # BLD AUTO: 2.27 K/UL — SIGNIFICANT CHANGE UP (ref 1–3.3)
LYMPHOCYTES NFR BLD AUTO: 20.8 % — SIGNIFICANT CHANGE UP (ref 13–44)
MCHC RBC-ENTMCNC: 27.8 PG — SIGNIFICANT CHANGE UP (ref 27–34)
MCHC RBC-ENTMCNC: 31.2 G/DL — LOW (ref 32–36)
MCV RBC AUTO: 88.9 FL — SIGNIFICANT CHANGE UP (ref 80–100)
MONOCYTES # BLD AUTO: 1.01 K/UL — HIGH (ref 0–0.9)
MONOCYTES NFR BLD AUTO: 9.3 % — SIGNIFICANT CHANGE UP (ref 2–14)
NEUTROPHILS # BLD AUTO: 7.37 K/UL — SIGNIFICANT CHANGE UP (ref 1.8–7.4)
NEUTROPHILS NFR BLD AUTO: 67.5 % — SIGNIFICANT CHANGE UP (ref 43–77)
NITRITE UR-MCNC: NEGATIVE — SIGNIFICANT CHANGE UP
NRBC # BLD AUTO: 0 K/UL — SIGNIFICANT CHANGE UP (ref 0–0)
NRBC # FLD: 0 K/UL — SIGNIFICANT CHANGE UP (ref 0–0)
NRBC BLD AUTO-RTO: 0 /100 WBCS — SIGNIFICANT CHANGE UP (ref 0–0)
PH UR: 6.5 — SIGNIFICANT CHANGE UP (ref 5–8)
PLATELET # BLD AUTO: 392 K/UL — SIGNIFICANT CHANGE UP (ref 150–400)
PMV BLD: 9.6 FL — SIGNIFICANT CHANGE UP (ref 7–13)
POTASSIUM SERPL-MCNC: 4 MMOL/L — SIGNIFICANT CHANGE UP (ref 3.5–5.3)
POTASSIUM SERPL-SCNC: 4 MMOL/L — SIGNIFICANT CHANGE UP (ref 3.5–5.3)
PROT UR-MCNC: NEGATIVE MG/DL — SIGNIFICANT CHANGE UP
RBC # BLD: 3.71 M/UL — LOW (ref 3.8–5.2)
RBC # FLD: 14.1 % — SIGNIFICANT CHANGE UP (ref 10.3–14.5)
SODIUM SERPL-SCNC: 140 MMOL/L — SIGNIFICANT CHANGE UP (ref 135–145)
SP GR SPEC: 1.01 — SIGNIFICANT CHANGE UP (ref 1–1.03)
UROBILINOGEN FLD QL: 0.2 MG/DL — SIGNIFICANT CHANGE UP (ref 0.2–1)
WBC # BLD: 10.91 K/UL — HIGH (ref 3.8–10.5)
WBC # FLD AUTO: 10.91 K/UL — HIGH (ref 3.8–10.5)

## 2025-07-12 PROCEDURE — 87040 BLOOD CULTURE FOR BACTERIA: CPT

## 2025-07-12 PROCEDURE — 85025 COMPLETE CBC W/AUTO DIFF WBC: CPT

## 2025-07-12 PROCEDURE — 87086 URINE CULTURE/COLONY COUNT: CPT

## 2025-07-12 PROCEDURE — 87070 CULTURE OTHR SPECIMN AEROBIC: CPT

## 2025-07-12 PROCEDURE — 87186 SC STD MICRODIL/AGAR DIL: CPT

## 2025-07-12 PROCEDURE — 96375 TX/PRO/DX INJ NEW DRUG ADDON: CPT

## 2025-07-12 PROCEDURE — 99284 EMERGENCY DEPT VISIT MOD MDM: CPT | Mod: 25

## 2025-07-12 PROCEDURE — 87205 SMEAR GRAM STAIN: CPT

## 2025-07-12 PROCEDURE — 83605 ASSAY OF LACTIC ACID: CPT

## 2025-07-12 PROCEDURE — 36415 COLL VENOUS BLD VENIPUNCTURE: CPT

## 2025-07-12 PROCEDURE — 74177 CT ABD & PELVIS W/CONTRAST: CPT

## 2025-07-12 PROCEDURE — 81001 URINALYSIS AUTO W/SCOPE: CPT

## 2025-07-12 PROCEDURE — 80048 BASIC METABOLIC PNL TOTAL CA: CPT

## 2025-07-12 PROCEDURE — 87077 CULTURE AEROBIC IDENTIFY: CPT

## 2025-07-12 PROCEDURE — 87075 CULTR BACTERIA EXCEPT BLOOD: CPT

## 2025-07-12 PROCEDURE — 96374 THER/PROPH/DIAG INJ IV PUSH: CPT | Mod: XU

## 2025-07-12 PROCEDURE — 99285 EMERGENCY DEPT VISIT HI MDM: CPT

## 2025-07-12 PROCEDURE — 74177 CT ABD & PELVIS W/CONTRAST: CPT | Mod: 26

## 2025-07-12 RX ORDER — VANCOMYCIN HCL IN 5 % DEXTROSE 1.5G/250ML
1000 PLASTIC BAG, INJECTION (ML) INTRAVENOUS ONCE
Refills: 0 | Status: COMPLETED | OUTPATIENT
Start: 2025-07-12 | End: 2025-07-12

## 2025-07-12 RX ORDER — PIPERACILLIN-TAZO-DEXTROSE,ISO 3.375G/5
3.38 IV SOLUTION, PIGGYBACK PREMIX FROZEN(ML) INTRAVENOUS ONCE
Refills: 0 | Status: COMPLETED | OUTPATIENT
Start: 2025-07-12 | End: 2025-07-12

## 2025-07-12 RX ORDER — CEPHALEXIN 250 MG/1
1 CAPSULE ORAL
Qty: 28 | Refills: 0
Start: 2025-07-12 | End: 2025-07-25

## 2025-07-12 RX ORDER — CEPHALEXIN 250 MG/1
500 CAPSULE ORAL ONCE
Refills: 0 | Status: COMPLETED | OUTPATIENT
Start: 2025-07-12 | End: 2025-07-12

## 2025-07-12 RX ADMIN — Medication 200 GRAM(S): at 18:31

## 2025-07-12 RX ADMIN — Medication 250 MILLIGRAM(S): at 19:32

## 2025-07-12 RX ADMIN — Medication 1000 MILLILITER(S): at 18:31

## 2025-07-12 RX ADMIN — CEPHALEXIN 500 MILLIGRAM(S): 250 CAPSULE ORAL at 23:48

## 2025-07-12 NOTE — ED PROVIDER NOTE - MDM ORDERS SUBMITTED SELECTION
Continue Regimen: clobetasol 0.05 % topical foam: Apply dime sized amounts to bald spots on scalp daily as needed Imaging Studies/Medications Render In Strict Bullet Format?: No Detail Level: Zone

## 2025-07-12 NOTE — ED ADULT NURSE NOTE - OBJECTIVE STATEMENT
aaox3. c section 6/26. complaint of pus from scar site. fevers on and off with Advil use. today saw saad pus in scar site while using bathroom. Copious amount pus noted at dressing. IV line placed. Labs sent. Care plan ongoing.

## 2025-07-12 NOTE — ED ADULT TRIAGE NOTE - CHIEF COMPLAINT QUOTE
Pt presents to ED C/O oozing pus from  incision site and intermittent fevers S/P scheduled  . Pt . States, " It's my second ". Reports taking Advil at 230pm PTA. Instructed to come to ED for " Clean out and IV antibiotics".

## 2025-07-12 NOTE — ED PROVIDER NOTE - OBJECTIVE STATEMENT
38 yo , , C section  planned , has 4 days of new fevers T max 101, today new pus from C section wound , reports irritation at wound but otherwise no significant pain, no cough, no urinary symptoms, no myalgias no n/v/d  no purulent vaginal discharge, minimal spotting.

## 2025-07-12 NOTE — ED PROVIDER NOTE - NSFOLLOWUPINSTRUCTIONS_ED_ALL_ED_FT
Please rest and remain well hydrated with plenty of fluids.  You can take motrin 600-800mg and tylenol 650mg every 3 hours, switching between the two for pain/bodyaches or fevers (>100.4F/>38C)    Please take full course of antibiotics as prescribed    Continue packing changes as instructed     Follow up on Wednesday as scheduled    Cellulitis    Cellulitis is a skin infection caused by bacteria. This condition occurs most often in the arms and lower legs but can occur anywhere over the body. Symptoms include redness, swelling, warm skin, tenderness, and chills/fever. If you were prescribed an antibiotic medicine, take it as told by your health care provider. Do not stop taking the antibiotic even if you start to feel better.    SEEK IMMEDIATE MEDICAL CARE IF YOU HAVE ANY OF THE FOLLOWING SYMPTOMS: worsening fever, red streaks coming from affected area, vomiting or diarrhea, or dizziness/lightheadedness.

## 2025-07-12 NOTE — ED PROVIDER NOTE - PROGRESS NOTE DETAILS
[facility]- gyn I&D abscess and placed packing.  recommend dc w/ keflex 500mg bid x 14 days and has f/u wednesday w/ her ob.  no need for admission. discussed strict return parameters

## 2025-07-12 NOTE — ED ADULT NURSE REASSESSMENT NOTE - NS ED NURSE REASSESS COMMENT FT1
Received report from Waldo HERRERA . Received patient in stretcher. AOX4. Vital signs as noted in flowsheet.  Patient denies chest pain, pain, discomfort, shortness of breath, difficulty breathing and any form of distress not noted. Patient oriented to ED area. Plan of care discussed and verbalized understanding. All needs attended. Purposeful proactive hourly rounding in progress.

## 2025-07-12 NOTE — ED PROVIDER NOTE - CLINICAL SUMMARY MEDICAL DECISION MAKING FREE TEXT BOX
Pt w fevers and purulent drainage from C section scar / fevers  local wound infection vs deeper abscess ,   antibiotics / OB gyn consult / CT   plan for admission

## 2025-07-12 NOTE — ED ADULT NURSE NOTE - NSFALLUNIVINTERV_ED_ALL_ED
Bed/Stretcher in lowest position, wheels locked, appropriate side rails in place/Call bell, personal items and telephone in reach/Instruct patient to call for assistance before getting out of bed/chair/stretcher/Non-slip footwear applied when patient is off stretcher/[place] to call system/Physically safe environment - no spills, clutter or unnecessary equipment/Purposeful proactive rounding/Room/bathroom lighting operational, light cord in reach

## 2025-07-12 NOTE — ED PROVIDER NOTE - GASTROINTESTINAL, MLM
Abdomen soft, tender at C section wound with purulent discharge, mild tendenrss to deeper palpation lower abd

## 2025-07-12 NOTE — ED PROVIDER NOTE - PATIENT PORTAL LINK FT
You can access the FollowMyHealth Patient Portal offered by [facility] by registering at the following website: [email or web address]. By joining [employer]’s FollowMyHealth portal, you will also be able to view your health information using other applications (apps) compatible with our system.

## 2025-07-13 LAB
CULTURE RESULTS: SIGNIFICANT CHANGE UP
GRAM STN FLD: ABNORMAL
SPECIMEN SOURCE: SIGNIFICANT CHANGE UP
SPECIMEN SOURCE: SIGNIFICANT CHANGE UP

## 2025-07-13 NOTE — PROVIDER CONTACT NOTE (CRITICAL VALUE NOTIFICATION) - TEST AND RESULT REPORTED:
gram stain few polymorhonuclear leukocytes seen per low power field  numerous gram positive cocci in clusters seen per oil power field

## 2025-07-13 NOTE — CONSULT NOTE ADULT - SUBJECTIVE AND OBJECTIVE BOX
Note delayed 2/2 patient care.  40yo  POD16 () s/p scheduled uncomplicated repeat CS at 40w0d. Patient's postoperative course was unremarkable. States that on Wednesday however she started feeling feverish. She took her temperature and states it was 100. States she took Advil and the fever broke by Thursday morning. this cycle of an afternoon fever with resolution after Advil continued until Friday evening. States on Friday evening she has a Tmax that was 101.4 and she presented to [facility] which is close to her home. She states she felt her skin around the incision had been irritated but she did not note any discharge or pain. States she was given a dose of cephalexin in the ED and discharge on 500mg BID however she was unable to  the antibiotics as they were not available at the pharmacy when she went to . States this AM she again had a T of 101.1 and took Advil. States the fever broke by lunchtime however around 3PM she noted a pus-like substance from the middle of the incision. States that the skin around the incision has become more tender. States during this time she has felt feverish when her temperature is elevated. She had chills on Wednesday however has not had chills since then. Denies chest pain, cough, congestion, or SOB. States she is not currently breastfeeding. Denies any breast pain, engorgement, masses, discharge or bleeding from the breasts. Denies tenderness at the uterus, generalized abdominal pain, nausea/vomiting, diarrhea or constipation. States vaginal bleeding has tapered off. Denies any vaginal discharge, itching or discomfort. Denies dysuria, urinary urgency or increased urinary frequency.     OBHX:  G1 -  pCS for NRFHT remote from delivery after IOL. Intrapartum course c/b gHTN  G2- 2025 rCS as above  GynHX: denies fibroids, ovarian cysts, abnormal pap smear, STI/herpes.   MedHX: denies  Surghx: denies  Medications: PNV  Allergies: NKDA    PHYSICAL EXAM:   Vital Signs Last 24 Hrs  T(C): 36.8 (2025 21:59), Max: 36.8 (2025 17:54)  T(F): 98.2 (2025 21:59), Max: 98.2 (2025 17:54)  HR: 89 (2025 21:59) (84 - 90)  BP: 123/83 (2025 21:59) (123/83 - 134/87)  BP(mean): --  RR: 18 (2025 21:59) (18 - 20)  SpO2: 97% (2025 21:59) (96% - 100%)    Parameters below as of 2025 21:59  Patient On (Oxygen Delivery Method): room air        **************************  Constitutional: No acute distress  Respiratory: Clear to ausculation bilaterally, no wheezes/rales/rhonchi  Cardiovascular: regular rate and rhythm  Gastrointestinal: soft, non tender, no rebound or guarding. No fundal tenderness. 2cm defect immediately left of incision midline probed to depth of ~1-2cm. Pus expressed from defect however underlying fascia noted to be intact. Skin surrounding incision noted to by erythematous and indurated, more significantly pronounced from midline to left side of incision  : scant brown spotting noted on pad, no bright red bleeding.    Extremities: no calf tenderness or swelling    LABS:                        10.3   10.91 )-----------( 392      ( 2025 17:05 )             33.0     07-12    140  |  103  |  15  ----------------------------<  101[H]  4.0   |  22  |  0.74    Ca    9.5      2025 17:05    TPro  7.7  /  Alb  4.1  /  TBili  0.4  /  DBili  x   /  AST  11  /  ALT  14  /  AlkPhos  143[H]  07-11      Urinalysis Basic - ( 2025 17:05 )    Color: Yellow / Appearance: Clear / S.008 / pH: x  Gluc: 101 mg/dL / Ketone: x  / Bili: Negative / Urobili: 0.2 mg/dL   Blood: x / Protein: Negative mg/dL / Nitrite: Negative   Leuk Esterase: Moderate / RBC: 1 /HPF / WBC 16 /HPF   Sq Epi: x / Non Sq Epi: 2 /HPF / Bacteria: Negative /HPF          RADIOLOGY & ADDITIONAL STUDIES:  < from: CT Abdomen and Pelvis w/ IV Cont (25 @ 19:52) >  PROCEDURE:  CT of the Abdomen and Pelvis was performed.  Sagittal and coronal reformats were performed.    FINDINGS:  LOWER CHEST: Within normal limits.    LIVER: Within normal limits.  BILE DUCTS: Normal caliber.  GALLBLADDER: Within normal limits.  SPLEEN: Within normal limits.  PANCREAS: Within normal limits.  ADRENALS: Within normal limits.  KIDNEYS/URETERS: Mild bilateral hydroureteronephrosis without evidence of   an obstructing stone. No renal stones.    BLADDER: Within normal limits.  REPRODUCTIVE ORGANS: Enlarged postpartum uterus and associated   postsurgical changes status post . Uterine fibroids.   Unremarkable adnexa.    BOWEL: No bowel obstruction. Appendix is normal.  PERITONEUM/RETROPERITONEUM: Within normal limits.  VESSELS: Within normal limits.  LYMPH NODES: No lymphadenopathy.  ABDOMINAL WALL: Postsurgical changes. Small fat-containing umbilical  hernia. Inflammatory fat stranding in the lower anterior pelvic wall. A   fluid collection is identified within the anterior pelvic wall   musculature near the midline measuring 3.3 cm and demonstrating mild rim   enhancement. An additional nonrim-enhancing collection is identified   within the anterior pelvic wall musculature near the midline measuring   4.8 cm. Poorly defined collection is seen more superficially measuring   6.0 cm transverse.  BONES: Within normal limits.    IMPRESSION:  Postsurgical changes status post . Postsurgical and/or   cellulitic changes in the anterior pelvic wall.    Few fluid collections within the anterior pelvic wall musculature and   more superficially within the pelvic wall could represent phlegmon/early   abscesses.    < end of copied text >

## 2025-07-13 NOTE — CONSULT NOTE ADULT - ASSESSMENT
38yo  POD16 () s/p scheduled uncomplicated repeat CS at 40w0d.  INCOMPLETE. 40yo  POD16 () s/p scheduled uncomplicated repeat CS at 40w0d, presenting with fever at home and pus draining from  scar, with CT c/f superficial surgical site infection.   - Patient febrile at home with Tmax of 101.4 however afebrile throughout entire evaluation. Last Advil was at 2:30PM on . Pt normotensive, nontachycardic throughout evaluation as above. Exam significant for 2cm defect immediately left of incision midline probed to depth of ~1-2cm. Pus expressed from defect however underlying fascia noted to be intact. Overlying erythema and induration.  - CT imaging with few fluid collections superficially c/w phlegmon vs early abscess.   - Given imaging findings, and exam with intact fascia underlying incision, suspect infection limited to subcutaneous tissue. Patient remains afebrile, therefore no indication for IV antibiotics or acute intervention.  - Wound irrigated and packed at the bedside with teaching provided for patient and . Recommendation for discharge with plan for wound packing at home to be performed by patient/ BID. Also recommending continuation of cephalexin course 500mg BID x14d.   - Patient counseled to followup with her OBGYN within 1 week.   - Counseled on return precautions, including spiking fever, worsening abdominal pain, nausea/vomiting, or bleeding. Patient endorses understanding and all questions were answered.     Greer Joseph, PGY2  D/w Dr. Gooden, Attending and Dr. Thompson, PGY4

## 2025-07-14 LAB
-  CLINDAMYCIN: SIGNIFICANT CHANGE UP
-  CLINDAMYCIN: SIGNIFICANT CHANGE UP
-  ERYTHROMYCIN: SIGNIFICANT CHANGE UP
-  ERYTHROMYCIN: SIGNIFICANT CHANGE UP
-  GENTAMICIN: SIGNIFICANT CHANGE UP
-  GENTAMICIN: SIGNIFICANT CHANGE UP
-  LINEZOLID: SIGNIFICANT CHANGE UP
-  OXACILLIN: SIGNIFICANT CHANGE UP
-  OXACILLIN: SIGNIFICANT CHANGE UP
-  PENICILLIN: SIGNIFICANT CHANGE UP
-  RIFAMPIN: SIGNIFICANT CHANGE UP
-  RIFAMPIN: SIGNIFICANT CHANGE UP
-  TETRACYCLINE: SIGNIFICANT CHANGE UP
-  TETRACYCLINE: SIGNIFICANT CHANGE UP
-  TRIMETHOPRIM/SULFAMETHOXAZOLE: SIGNIFICANT CHANGE UP
-  TRIMETHOPRIM/SULFAMETHOXAZOLE: SIGNIFICANT CHANGE UP
-  VANCOMYCIN: SIGNIFICANT CHANGE UP
-  VANCOMYCIN: SIGNIFICANT CHANGE UP
CULTURE RESULTS: SIGNIFICANT CHANGE UP
METHOD TYPE: SIGNIFICANT CHANGE UP
METHOD TYPE: SIGNIFICANT CHANGE UP
SPECIMEN SOURCE: SIGNIFICANT CHANGE UP

## 2025-07-14 RX ORDER — SULFAMETHOXAZOLE/TRIMETHOPRIM 800-160 MG
1 TABLET ORAL
Refills: 0
Start: 2025-07-14

## 2025-07-14 RX ORDER — SULFAMETHOXAZOLE/TRIMETHOPRIM 800-160 MG
1 TABLET ORAL
Qty: 28 | Refills: 0
Start: 2025-07-14 | End: 2025-07-27

## 2025-07-14 NOTE — CHART NOTE - NSCHARTNOTEFT_GEN_A_CORE
Called patient to inform her of need to switch antibiotics, no answer. Left voicemail. Bactrim DS BID sent to pharmacy.    NS PGY3

## 2025-07-16 ENCOUNTER — APPOINTMENT (OUTPATIENT)
Dept: OBGYN | Facility: CLINIC | Age: 40
End: 2025-07-16

## 2025-07-16 VITALS
HEIGHT: 62 IN | HEART RATE: 75 BPM | DIASTOLIC BLOOD PRESSURE: 84 MMHG | BODY MASS INDEX: 27.05 KG/M2 | OXYGEN SATURATION: 100 % | WEIGHT: 147 LBS | SYSTOLIC BLOOD PRESSURE: 133 MMHG

## 2025-07-17 LAB
CULTURE RESULTS: ABNORMAL
ORGANISM # SPEC MICROSCOPIC CNT: ABNORMAL
ORGANISM # SPEC MICROSCOPIC CNT: ABNORMAL
ORGANISM # SPEC MICROSCOPIC CNT: SIGNIFICANT CHANGE UP
SPECIMEN SOURCE: SIGNIFICANT CHANGE UP

## 2025-07-18 LAB
CULTURE RESULTS: SIGNIFICANT CHANGE UP
CULTURE RESULTS: SIGNIFICANT CHANGE UP
SPECIMEN SOURCE: SIGNIFICANT CHANGE UP
SPECIMEN SOURCE: SIGNIFICANT CHANGE UP

## 2025-08-04 ENCOUNTER — NON-APPOINTMENT (OUTPATIENT)
Age: 40
End: 2025-08-04

## 2025-08-04 ENCOUNTER — APPOINTMENT (OUTPATIENT)
Dept: OBGYN | Facility: CLINIC | Age: 40
End: 2025-08-04

## 2025-08-04 VITALS
BODY MASS INDEX: 27.05 KG/M2 | WEIGHT: 147 LBS | OXYGEN SATURATION: 98 % | HEIGHT: 62 IN | SYSTOLIC BLOOD PRESSURE: 129 MMHG | HEART RATE: 75 BPM | DIASTOLIC BLOOD PRESSURE: 94 MMHG

## 2025-08-07 ENCOUNTER — TRANSCRIPTION ENCOUNTER (OUTPATIENT)
Age: 40
End: 2025-08-07